# Patient Record
Sex: FEMALE | Race: BLACK OR AFRICAN AMERICAN | NOT HISPANIC OR LATINO | Employment: UNEMPLOYED | RURAL
[De-identification: names, ages, dates, MRNs, and addresses within clinical notes are randomized per-mention and may not be internally consistent; named-entity substitution may affect disease eponyms.]

---

## 2019-07-16 ENCOUNTER — HISTORICAL (OUTPATIENT)
Dept: ADMINISTRATIVE | Facility: HOSPITAL | Age: 54
End: 2019-07-16

## 2019-07-17 LAB
LAB AP CLINICAL INFORMATION: NORMAL
LAB AP GENERAL CAT - HISTORICAL: NORMAL
LAB AP INTERPRETATION/RESULT - HISTORICAL: NEGATIVE
LAB AP SPECIMEN ADEQUACY - HISTORICAL: NORMAL
LAB AP SPECIMEN SUBMITTED - HISTORICAL: NORMAL

## 2020-05-18 ENCOUNTER — HISTORICAL (OUTPATIENT)
Dept: ADMINISTRATIVE | Facility: HOSPITAL | Age: 55
End: 2020-05-18

## 2020-07-21 ENCOUNTER — HISTORICAL (OUTPATIENT)
Dept: ADMINISTRATIVE | Facility: HOSPITAL | Age: 55
End: 2020-07-21

## 2020-10-09 ENCOUNTER — HISTORICAL (OUTPATIENT)
Dept: ADMINISTRATIVE | Facility: HOSPITAL | Age: 55
End: 2020-10-09

## 2020-10-09 LAB
BACTERIA #/AREA URNS HPF: ABNORMAL /HPF
BILIRUB UR QL STRIP: NEGATIVE MG/DL
CLARITY UR: CLEAR
COLOR UR: YELLOW
GLUCOSE UR STRIP-MCNC: ABNORMAL MG/DL
KETONES UR STRIP-SCNC: NEGATIVE MG/DL
LEUKOCYTE ESTERASE UR QL STRIP: NEGATIVE LEU/UL
NITRITE UR QL STRIP: NEGATIVE
PH UR STRIP: 5.5 PH UNITS (ref 5–8)
PROT UR QL STRIP: NEGATIVE MG/DL
RBC # UR STRIP: ABNORMAL ERY/UL
RBC #/AREA URNS HPF: ABNORMAL /HPF (ref 0–3)
SP GR UR STRIP: 1.03 (ref 1–1.03)
SQUAMOUS #/AREA URNS LPF: ABNORMAL /LPF
UROBILINOGEN UR STRIP-ACNC: 0.2 MG/DL
WBC #/AREA URNS HPF: ABNORMAL /HPF (ref 0–5)

## 2020-10-11 LAB
REPORT: NORMAL

## 2021-09-29 ENCOUNTER — OFFICE VISIT (OUTPATIENT)
Dept: DERMATOLOGY | Facility: CLINIC | Age: 56
End: 2021-09-29
Payer: MEDICAID

## 2021-09-29 VITALS — HEIGHT: 65 IN | BODY MASS INDEX: 30.49 KG/M2 | WEIGHT: 183 LBS | RESPIRATION RATE: 16 BRPM

## 2021-09-29 DIAGNOSIS — L74.510 HYPERHIDROSIS OF AXILLA: ICD-10-CM

## 2021-09-29 DIAGNOSIS — L73.2 HIDRADENITIS SUPPURATIVA: Primary | ICD-10-CM

## 2021-09-29 PROCEDURE — 99214 PR OFFICE/OUTPT VISIT, EST, LEVL IV, 30-39 MIN: ICD-10-PCS | Mod: ,,, | Performed by: DERMATOLOGY

## 2021-09-29 PROCEDURE — 99214 OFFICE O/P EST MOD 30 MIN: CPT | Mod: ,,, | Performed by: DERMATOLOGY

## 2021-09-29 RX ORDER — ATENOLOL AND CHLORTHALIDONE TABLET 100; 25 MG/1; MG/1
1 TABLET ORAL DAILY
COMMUNITY
End: 2024-01-18

## 2021-09-29 RX ORDER — POTASSIUM CITRATE 10 MEQ/1
20 TABLET, EXTENDED RELEASE ORAL
COMMUNITY
Start: 2021-04-26

## 2021-09-29 RX ORDER — CHLORHEXIDINE GLUCONATE 40 MG/ML
SOLUTION TOPICAL
Qty: 473 ML | Refills: 11 | Status: SHIPPED | OUTPATIENT
Start: 2021-09-29

## 2021-09-29 RX ORDER — INSULIN GLARGINE 100 [IU]/ML
INJECTION, SOLUTION SUBCUTANEOUS
COMMUNITY
Start: 2021-09-24 | End: 2022-04-07

## 2021-09-29 RX ORDER — CALCIUM CITRATE/VITAMIN D3 200MG-6.25
TABLET ORAL
COMMUNITY
Start: 2021-08-06 | End: 2022-01-06 | Stop reason: SDUPTHER

## 2021-09-29 RX ORDER — LANCETS 33 GAUGE
EACH MISCELLANEOUS
COMMUNITY
Start: 2021-04-16 | End: 2022-01-06 | Stop reason: SDUPTHER

## 2021-09-29 RX ORDER — ALUMINUM CHLORIDE 20 %
SOLUTION, NON-ORAL TOPICAL
Qty: 60 ML | Refills: 11 | Status: SHIPPED | OUTPATIENT
Start: 2021-09-29 | End: 2023-06-05 | Stop reason: SDUPTHER

## 2021-09-29 RX ORDER — LIRAGLUTIDE 6 MG/ML
INJECTION SUBCUTANEOUS
COMMUNITY
Start: 2021-07-29 | End: 2021-12-10

## 2021-09-29 RX ORDER — CLINDAMYCIN PHOSPHATE 10 UG/ML
LOTION TOPICAL
Qty: 60 ML | Refills: 11 | OUTPATIENT
Start: 2021-09-29 | End: 2022-04-07

## 2021-09-29 RX ORDER — FLUCONAZOLE 150 MG/1
TABLET ORAL
COMMUNITY
Start: 2021-06-27 | End: 2022-04-07

## 2021-09-29 RX ORDER — PEN NEEDLE, DIABETIC 30 GX 1/3"
NEEDLE, DISPOSABLE MISCELLANEOUS
COMMUNITY
Start: 2015-04-28 | End: 2022-04-07

## 2021-09-29 RX ORDER — HYDROCODONE BITARTRATE AND ACETAMINOPHEN 10; 325 MG/1; MG/1
TABLET ORAL
COMMUNITY
Start: 2015-05-01

## 2021-09-29 RX ORDER — ERGOCALCIFEROL 1.25 MG/1
50000 CAPSULE ORAL
COMMUNITY
Start: 2015-05-01 | End: 2024-02-09 | Stop reason: SDUPTHER

## 2021-09-29 RX ORDER — BROMPHENIRAMINE MALEATE, DEXTROMETHORPHAN HYDROBROMIDE AND PHENYLEPHRINE HYDROCHLORIDE 1; 5; 2.5 MG/5ML; MG/5ML; MG/5ML
LIQUID ORAL
COMMUNITY
Start: 2021-09-24 | End: 2022-01-06 | Stop reason: ALTCHOICE

## 2021-09-29 RX ORDER — TAMSULOSIN HYDROCHLORIDE 0.4 MG/1
0.4 CAPSULE ORAL
COMMUNITY
Start: 2021-04-26 | End: 2024-01-18

## 2021-09-29 RX ORDER — DOXYCYCLINE 100 MG/1
CAPSULE ORAL
Qty: 60 CAPSULE | Refills: 5 | OUTPATIENT
Start: 2021-09-29 | End: 2022-04-07

## 2022-01-06 ENCOUNTER — OFFICE VISIT (OUTPATIENT)
Dept: FAMILY MEDICINE | Facility: CLINIC | Age: 57
End: 2022-01-06
Payer: MEDICAID

## 2022-01-06 VITALS
DIASTOLIC BLOOD PRESSURE: 74 MMHG | SYSTOLIC BLOOD PRESSURE: 137 MMHG | HEART RATE: 88 BPM | HEIGHT: 64 IN | WEIGHT: 183 LBS | TEMPERATURE: 99 F | RESPIRATION RATE: 18 BRPM | OXYGEN SATURATION: 99 % | BODY MASS INDEX: 31.24 KG/M2

## 2022-01-06 DIAGNOSIS — Z79.4 TYPE 2 DIABETES MELLITUS WITHOUT COMPLICATION, WITH LONG-TERM CURRENT USE OF INSULIN: ICD-10-CM

## 2022-01-06 DIAGNOSIS — E11.9 TYPE 2 DIABETES MELLITUS WITHOUT COMPLICATION, WITH LONG-TERM CURRENT USE OF INSULIN: ICD-10-CM

## 2022-01-06 DIAGNOSIS — R05.8 COUGH WITH EXPOSURE TO SEVERE ACUTE RESPIRATORY SYNDROME CORONAVIRUS 2 (SARS-COV-2): ICD-10-CM

## 2022-01-06 DIAGNOSIS — Z20.822 COUGH WITH EXPOSURE TO SEVERE ACUTE RESPIRATORY SYNDROME CORONAVIRUS 2 (SARS-COV-2): ICD-10-CM

## 2022-01-06 DIAGNOSIS — J32.9 SINUSITIS, UNSPECIFIED CHRONICITY, UNSPECIFIED LOCATION: Primary | ICD-10-CM

## 2022-01-06 PROCEDURE — 3008F PR BODY MASS INDEX (BMI) DOCUMENTED: ICD-10-PCS | Mod: CPTII,,, | Performed by: FAMILY MEDICINE

## 2022-01-06 PROCEDURE — 3075F SYST BP GE 130 - 139MM HG: CPT | Mod: CPTII,,, | Performed by: FAMILY MEDICINE

## 2022-01-06 PROCEDURE — 3008F BODY MASS INDEX DOCD: CPT | Mod: CPTII,,, | Performed by: FAMILY MEDICINE

## 2022-01-06 PROCEDURE — 1159F PR MEDICATION LIST DOCUMENTED IN MEDICAL RECORD: ICD-10-PCS | Mod: CPTII,,, | Performed by: FAMILY MEDICINE

## 2022-01-06 PROCEDURE — 1159F MED LIST DOCD IN RCRD: CPT | Mod: CPTII,,, | Performed by: FAMILY MEDICINE

## 2022-01-06 PROCEDURE — 3075F PR MOST RECENT SYSTOLIC BLOOD PRESS GE 130-139MM HG: ICD-10-PCS | Mod: CPTII,,, | Performed by: FAMILY MEDICINE

## 2022-01-06 PROCEDURE — 99213 PR OFFICE/OUTPT VISIT, EST, LEVL III, 20-29 MIN: ICD-10-PCS | Mod: ,,, | Performed by: FAMILY MEDICINE

## 2022-01-06 PROCEDURE — 3078F DIAST BP <80 MM HG: CPT | Mod: CPTII,,, | Performed by: FAMILY MEDICINE

## 2022-01-06 PROCEDURE — 99213 OFFICE O/P EST LOW 20 MIN: CPT | Mod: ,,, | Performed by: FAMILY MEDICINE

## 2022-01-06 PROCEDURE — 3078F PR MOST RECENT DIASTOLIC BLOOD PRESSURE < 80 MM HG: ICD-10-PCS | Mod: CPTII,,, | Performed by: FAMILY MEDICINE

## 2022-01-06 RX ORDER — CEFUROXIME AXETIL 500 MG/1
500 TABLET ORAL EVERY 12 HOURS
Qty: 20 TABLET | Refills: 0 | OUTPATIENT
Start: 2022-01-06 | End: 2022-04-07

## 2022-01-06 RX ORDER — BENZONATATE 100 MG/1
200 CAPSULE ORAL 3 TIMES DAILY PRN
Qty: 60 CAPSULE | Refills: 0 | Status: SHIPPED | OUTPATIENT
Start: 2022-01-06 | End: 2022-01-16

## 2022-01-06 NOTE — PATIENT INSTRUCTIONS
1. Take medications as directed  2. Monitor temperature, if fever begins, tyelenol or ibuprofen can be used as long as you have no history of abnormal reactions to these medications. Follow the instructions on the bottle or contact clinic with questions.   3. Please contact clinic if symptoms begin to get worse.   4. Report to the ER if you feel your symptoms are severe and life threatening.

## 2022-01-06 NOTE — PROGRESS NOTES
New Clinic Note    Leti Landeros is a 56 y.o. female     CC:   Chief Complaint   Patient presents with    Cough     Patient ststes she has coughed so much she is hurting in her side/ rib area.    Sinus Problem    Nasal Congestion     Patient states she went to [facility] 9 days ago. She has chronic sinusitis but has had a cough. Sputum is clear. She has had sinus drainage, sinus and facial pain and pressure. Both ears are hurting and she is hoarse. She states she is not getting worse but just not getting any better. She was not positve for covid or flu at that visit but she did caleb positive for strep. She was given a Rocephin shot, Amoxicillin, Ed A Hist and tessalon perls. Nothing has helped the cough.        Subjective    History of Present Illness Patient was seen for sinus problems and sore throat at [facility] about 9 days ago. She was treated for strep. She still has a cough and congestion.  Complains of sore throat and ear pain. She needs her diabetic supplies refilled.    Cough  Associated symptoms include ear pain and postnasal drip. Pertinent negatives include no chest pain, fever, headaches, rhinorrhea or shortness of breath.   Sinus Problem  Associated symptoms include congestion, coughing, ear pain and sinus pressure. Pertinent negatives include no headaches or shortness of breath.      Upper or lower respiratory infection symptoms:      When did your symptoms start: 9 days ago  Are they getting worse: no but not getting any better  What are your symptoms: constant cough, sinus congestion and pressure, ears hurting and hoarsnes  Is it productive: yes               what color sputum: clear      Current Outpatient Medications:     aluminum chloride (DRYSOL DAB-O-MATIC) 20 % external solution, Apply to underarms daily, Disp: 60 mL, Rfl: 11    atenoloL-chlorthalidone (TENORETIC) 100-25 mg per tablet, Take 1 tablet by mouth once daily., Disp: , Rfl:     chlorhexidine (HIBICLENS)  "4 % external liquid, Use as a daily wash to affected areas, Disp: 473 mL, Rfl: 11    clindamycin (CLEOCIN T) 1 % lotion, Apply thin layer to affected areas daily after shower, Disp: 60 mL, Rfl: 11    doxycycline (VIBRAMYCIN) 100 MG Cap, Take one 100 mg capsule twice a day with food and water but not dairy products, Disp: 60 capsule, Rfl: 5    ergocalciferol (ERGOCALCIFEROL) 50,000 unit Cap, , Disp: , Rfl:     HYDROcodone-acetaminophen (NORCO)  mg per tablet, , Disp: , Rfl:     LANTUS SOLOSTAR U-100 INSULIN glargine 100 units/mL (3mL) SubQ pen, INJECT 50 UNITS UNDER THE SKIN EVERY DAY, Disp: , Rfl:     pen needle, diabetic (NOVOTWIST) 32 gauge x 1/5" Ndle, , Disp: , Rfl:     potassium citrate (UROCIT-K) 10 mEq (1,080 mg) TbSR, Take 20 mEq by mouth., Disp: , Rfl:     tamsulosin (FLOMAX) 0.4 mg Cap, Take 0.4 mg by mouth., Disp: , Rfl:     VICTOZA 3-CHASIDY 0.6 mg/0.1 mL (18 mg/3 mL) PnIj pen, INJECT 1.8MG UNDER THE SKIN EVERY DAY, Disp: 9 mL, Rfl: 1    benzonatate (TESSALON) 100 MG capsule, Take 2 capsules (200 mg total) by mouth 3 (three) times daily as needed for Cough., Disp: 60 capsule, Rfl: 0    brompheniramin-phenylephrin-DM (RYNEX DM) 1-2.5-5 mg/5 mL Soln, Take 10 mLs by mouth every 4 (four) hours as needed., Disp: 180 mL, Rfl: 1    cefUROXime (CEFTIN) 500 MG tablet, Take 1 tablet (500 mg total) by mouth every 12 (twelve) hours., Disp: 20 tablet, Rfl: 0    fluconazole (DIFLUCAN) 150 MG Tab, TAKE 1 TABLET BY MOUTH 1 TIME, Disp: , Rfl:     SEMAGLUTIDE ORAL, Take by mouth., Disp: , Rfl:     TRUE METRIX GLUCOSE TEST STRIP Strp, USE ONE STRIP TO CHECK GLUCOSE TWICE DAILY ICD 10 E11.9, Disp: 300 strip, Rfl: 3    TRUEPLUS LANCETS 33 gauge Misc, USE 1 TO CHECK GLUCOSE TWICE DAILY ICD 10 code E11.9, Disp: 200 each, Rfl: 3     Past Medical History:   Diagnosis Date    Diabetes mellitus     Fibromyalgia     Scleroderma     Sinusitis         No family history on file.     Past Surgical History: " "  Procedure Laterality Date     SECTION      DILATION AND CURETTAGE OF UTERUS      TONSILLECTOMY, ADENOIDECTOMY          Review of Systems   Constitutional: Negative for fatigue and fever.   HENT: Positive for nasal congestion, ear pain, postnasal drip, sinus pressure/congestion and voice change. Negative for rhinorrhea.    Respiratory: Positive for cough. Negative for shortness of breath.    Cardiovascular: Negative for chest pain.   Gastrointestinal: Negative for abdominal pain, diarrhea, nausea and vomiting.   Genitourinary: Negative for dysuria.   Neurological: Negative for headaches.        /74 (BP Location: Left arm, Patient Position: Sitting, BP Method: Medium (Automatic))   Pulse 88   Temp 99.4 °F (37.4 °C) (Oral)   Resp 18   Ht 5' 4" (1.626 m)   Wt 83 kg (183 lb)   SpO2 99%   BMI 31.41 kg/m²      Physical Exam  HENT:      Head: Normocephalic and atraumatic.   Pulmonary:      Effort: Pulmonary effort is normal.   Neurological:      Mental Status: She is alert and oriented to person, place, and time.   Psychiatric:         Mood and Affect: Mood normal.         Behavior: Behavior normal.          Assessment and Plan      ICD-10-CM ICD-9-CM   1. Sinusitis, unspecified chronicity, unspecified location  J32.9 473.9   2. Cough with exposure to severe acute respiratory syndrome coronavirus 2 (SARS-CoV-2)  R05.8 786.2    Z20.822 V01.79   3. Type 2 diabetes mellitus without complication, with long-term current use of insulin  E11.9 250.00    Z79.4 V58.67        Problem List Items Addressed This Visit    None     Visit Diagnoses     Sinusitis, unspecified chronicity, unspecified location    -  Primary    Relevant Medications    cefUROXime (CEFTIN) 500 MG tablet    brompheniramin-phenylephrin-DM (RYNEX DM) 1-2.5-5 mg/5 mL Soln    benzonatate (TESSALON) 100 MG capsule    Cough with exposure to severe acute respiratory syndrome coronavirus 2 (SARS-CoV-2)        Relevant Orders    SARS Coronavirus " 2 Antigen, POCT    POCT Influenza A/B    Type 2 diabetes mellitus without complication, with long-term current use of insulin        Relevant Medications    TRUE METRIX GLUCOSE TEST STRIP Strp    [facility]PL[facility] LANCETS [facility] gauge [facility]           Patient Instructions   1. Take medications as directed  2. Monitor temperature, if fever begins, tyelenol or ibuprofen can be used as long as you have no history of abnormal reactions to these medications. Follow the instructions on the bottle or contact clinic with questions.   3. Please contact clinic if symptoms begin to get worse.   4. Report to the ER if you feel your symptoms are severe and life threatening.          Follow up if symptoms worsen or fail to improve.

## 2022-01-07 RX ORDER — LANCETS 33 GAUGE
EACH MISCELLANEOUS
Qty: 200 EACH | Refills: 3 | Status: SHIPPED | OUTPATIENT
Start: 2022-01-07 | End: 2023-11-04

## 2022-01-07 RX ORDER — CALCIUM CITRATE/VITAMIN D3 200MG-6.25
TABLET ORAL
Qty: 300 STRIP | Refills: 3 | Status: SHIPPED | OUTPATIENT
Start: 2022-01-07 | End: 2022-05-05

## 2022-01-16 PROBLEM — Z79.4 TYPE 2 DIABETES MELLITUS WITHOUT COMPLICATION, WITH LONG-TERM CURRENT USE OF INSULIN: Status: ACTIVE | Noted: 2022-01-16

## 2022-01-16 PROBLEM — E11.9 TYPE 2 DIABETES MELLITUS WITHOUT COMPLICATION, WITH LONG-TERM CURRENT USE OF INSULIN: Status: ACTIVE | Noted: 2022-01-16

## 2022-04-07 ENCOUNTER — HOSPITAL ENCOUNTER (EMERGENCY)
Facility: HOSPITAL | Age: 57
Discharge: HOME | End: 2022-04-07
Payer: MEDICAID

## 2022-04-07 VITALS
TEMPERATURE: 98 F | DIASTOLIC BLOOD PRESSURE: 75 MMHG | OXYGEN SATURATION: 98 % | SYSTOLIC BLOOD PRESSURE: 149 MMHG | HEIGHT: 64 IN | HEART RATE: 76 BPM | RESPIRATION RATE: 18 BRPM | WEIGHT: 173 LBS | BODY MASS INDEX: 29.53 KG/M2

## 2022-04-07 DIAGNOSIS — R73.9 HYPERGLYCEMIA: Primary | ICD-10-CM

## 2022-04-07 LAB
ALBUMIN SERPL BCP-MCNC: 3.6 G/DL (ref 3.5–5)
ALBUMIN/GLOB SERPL: 1.1 {RATIO}
ALP SERPL-CCNC: 81 U/L (ref 46–118)
ALT SERPL W P-5'-P-CCNC: 30 U/L (ref 13–56)
ANION GAP SERPL CALCULATED.3IONS-SCNC: 16 MMOL/L (ref 7–16)
AST SERPL W P-5'-P-CCNC: 21 U/L (ref 15–37)
BACTERIA #/AREA URNS HPF: ABNORMAL /HPF
BILIRUB SERPL-MCNC: 0.3 MG/DL (ref 0–1.2)
BILIRUB UR QL STRIP: NEGATIVE
BUN SERPL-MCNC: 11 MG/DL (ref 7–18)
BUN/CREAT SERPL: 16 (ref 6–20)
CALCIUM SERPL-MCNC: 8.6 MG/DL (ref 8.5–10.1)
CHLORIDE SERPL-SCNC: 101 MMOL/L (ref 98–107)
CLARITY UR: CLEAR
CO2 SERPL-SCNC: 27 MMOL/L (ref 21–32)
COLOR UR: YELLOW
CREAT SERPL-MCNC: 0.7 MG/DL (ref 0.55–1.02)
EOSINOPHIL NFR BLD MANUAL: 2 % (ref 1–4)
ERYTHROCYTE [DISTWIDTH] IN BLOOD BY AUTOMATED COUNT: 14.5 % (ref 11.5–14.5)
GLOBULIN SER-MCNC: 3.3 G/DL (ref 2–4)
GLUCOSE SERPL-MCNC: 265 MG/DL (ref 70–105)
GLUCOSE SERPL-MCNC: 318 MG/DL (ref 74–106)
GLUCOSE UR STRIP-MCNC: >=1000 MG/DL
HCT VFR BLD AUTO: 41.5 % (ref 38–47)
HGB BLD-MCNC: 13.4 G/DL (ref 12–16)
KETONES UR STRIP-SCNC: ABNORMAL MG/DL
LEUKOCYTE ESTERASE UR QL STRIP: NEGATIVE
LYMPHOCYTES NFR BLD MANUAL: 39 % (ref 27–41)
MCH RBC QN AUTO: 27.5 PG (ref 27–31)
MCHC RBC AUTO-ENTMCNC: 32.3 G/DL (ref 32–36)
MCV RBC AUTO: 85 FL (ref 80–96)
MONOCYTES NFR BLD MANUAL: 8 % (ref 2–6)
MPC BLD CALC-MCNC: 10 FL (ref 9.4–12.4)
MUCOUS THREADS #/AREA URNS HPF: ABNORMAL /HPF
NEUTS SEG NFR BLD MANUAL: 51 % (ref 50–62)
NITRITE UR QL STRIP: NEGATIVE
NRBC BLD MANUAL-RTO: ABNORMAL %
PH UR STRIP: 5.5 PH UNITS
PLATELET # BLD AUTO: 328 K/UL (ref 150–400)
POTASSIUM SERPL-SCNC: 3.6 MMOL/L (ref 3.5–5.1)
PROT SERPL-MCNC: 6.9 G/DL (ref 6.4–8.2)
PROT UR QL STRIP: NEGATIVE
RBC # BLD AUTO: 4.88 M/UL (ref 4.2–5.4)
RBC # UR STRIP: ABNORMAL /UL
RBC #/AREA URNS HPF: ABNORMAL /HPF
SODIUM SERPL-SCNC: 140 MMOL/L (ref 136–145)
SP GR UR STRIP: >=1.03
SQUAMOUS #/AREA URNS LPF: ABNORMAL /LPF
UROBILINOGEN UR STRIP-ACNC: 0.2 MG/DL
WBC # BLD AUTO: 8.1 K/UL (ref 4.5–11)
WBC #/AREA URNS HPF: ABNORMAL /HPF

## 2022-04-07 PROCEDURE — 96374 THER/PROPH/DIAG INJ IV PUSH: CPT

## 2022-04-07 PROCEDURE — 99283 EMERGENCY DEPT VISIT LOW MDM: CPT | Mod: ,,, | Performed by: NURSE PRACTITIONER

## 2022-04-07 PROCEDURE — 36415 COLL VENOUS BLD VENIPUNCTURE: CPT | Performed by: NURSE PRACTITIONER

## 2022-04-07 PROCEDURE — 82962 GLUCOSE BLOOD TEST: CPT

## 2022-04-07 PROCEDURE — 99284 EMERGENCY DEPT VISIT MOD MDM: CPT | Mod: 25

## 2022-04-07 PROCEDURE — 81001 URINALYSIS AUTO W/SCOPE: CPT | Performed by: NURSE PRACTITIONER

## 2022-04-07 PROCEDURE — 99283 PR EMERGENCY DEPT VISIT,LEVEL III: ICD-10-PCS | Mod: ,,, | Performed by: NURSE PRACTITIONER

## 2022-04-07 PROCEDURE — 63600175 PHARM REV CODE 636 W HCPCS: Performed by: NURSE PRACTITIONER

## 2022-04-07 PROCEDURE — 85025 COMPLETE CBC W/AUTO DIFF WBC: CPT | Performed by: NURSE PRACTITIONER

## 2022-04-07 PROCEDURE — 80053 COMPREHEN METABOLIC PANEL: CPT | Performed by: NURSE PRACTITIONER

## 2022-04-07 RX ADMIN — HUMAN INSULIN 5 UNITS: 100 INJECTION, SOLUTION SUBCUTANEOUS at 11:04

## 2022-04-07 NOTE — ED TRIAGE NOTES
"56 year old female presents to ed with c/o elevated blood sugar.   States her machine read "hi" this morning when she checked it.   C/o sinus headache and congestion  "

## 2022-04-07 NOTE — ED PROVIDER NOTES
Encounter Date: 2022       History     Chief Complaint   Patient presents with    Hyperglycemia     Leti Landeros is a 55 y/o AAF who presents to the ED with complaint of blood sugar being too high to read at home about 1 hour ago prior to taking Rybelsus  and Dr. Singh's (PCP) office is closed today. Denies any dizziness, headaches, fever,increase thirst, abdominal pain, nausea, vomiting or diarrhea. Reports urinary frequency started about 3 days ago.   A1C was greater than 14% about 2 months ago, blood sugars have been running 200-400's at home. Was seen by Dr. Singh 2 weeks ago, Rybelsus increased to 7 mg, but was unable to get with insurance. Has been taking a 3mg tab sample of Rybelsus until yesterday and she started taking 2 tabs a day. She has been off Lantus about 2-3 weeks since starting Rybelsus. States that Lantus made her have changes in vision. Has taken Tresiba in the past and tolerated well, but insurance would not cover it.  Of note, had UTI 3 weeks ago that was treated with Levaquin.        Review of patient's allergies indicates:   Allergen Reactions    Adhesive tape-silicones     Promethazine hcl     Sulfa (sulfonamide antibiotics)      Patient is also allergic to an unknown steroid drug    Ultram [tramadol]     Methocarbamol Nausea And Vomiting     Past Medical History:   Diagnosis Date    Diabetes mellitus     Fibromyalgia     Scleroderma     Sinusitis      Past Surgical History:   Procedure Laterality Date     SECTION      DILATION AND CURETTAGE OF UTERUS      TONSILLECTOMY, ADENOIDECTOMY       History reviewed. No pertinent family history.  Social History     Tobacco Use    Smoking status: Never Smoker    Smokeless tobacco: Never Used   Substance Use Topics    Alcohol use: Not Currently    Drug use: Never     Review of Systems   Constitutional: Negative.  Negative for activity change, appetite change, fatigue, fever and unexpected weight change.   HENT: Positive  for congestion. Negative for ear pain, postnasal drip, rhinorrhea, sinus pressure, sinus pain, sore throat and voice change.    Eyes: Positive for pain. Negative for photophobia and visual disturbance.   Respiratory: Negative for cough and shortness of breath.    Cardiovascular: Negative.    Gastrointestinal: Negative.  Negative for abdominal pain, diarrhea, nausea and vomiting.   Genitourinary: Positive for frequency. Negative for dysuria, hematuria, pelvic pain and vaginal discharge.   Musculoskeletal: Negative.    Skin: Negative.    Neurological: Negative.  Negative for dizziness, syncope, numbness and headaches.   Hematological: Negative.    Psychiatric/Behavioral: Negative.        Physical Exam     Initial Vitals [04/07/22 1004]   BP Pulse Resp Temp SpO2   (!) 165/85 84 18 98.1 °F (36.7 °C) 99 %      MAP       --         Physical Exam    Nursing note and vitals reviewed.  Constitutional: She appears well-developed and well-nourished. She is Obese . She is cooperative.   HENT:   Head: Normocephalic.   Right Ear: External ear normal.   Left Ear: External ear normal.   Nose: Nose normal.   Mouth/Throat: Uvula is midline, oropharynx is clear and moist and mucous membranes are normal.   Eyes: Conjunctivae and lids are normal. Pupils are equal, round, and reactive to light.   Neck: Neck supple.   Normal range of motion.  Cardiovascular: Normal rate, regular rhythm, normal heart sounds and normal pulses.   Pulmonary/Chest: Effort normal and breath sounds normal.   Abdominal: Abdomen is soft. Bowel sounds are normal. There is no abdominal tenderness.   Musculoskeletal:         General: Normal range of motion.      Cervical back: Normal range of motion and neck supple.     Lymphadenopathy:     She has no cervical adenopathy.   Neurological: She is alert and oriented to person, place, and time. No cranial nerve deficit.   Skin: Skin is warm and dry. Capillary refill takes less than 2 seconds. No rash noted.    Psychiatric: She has a normal mood and affect. Her behavior is normal. Judgment and thought content normal.         Medical Screening Exam   See Full Note    ED Course   Procedures  Labs Reviewed   COMPREHENSIVE METABOLIC PANEL - Abnormal; Notable for the following components:       Result Value    Glucose 318 (*)     All other components within normal limits   URINALYSIS, REFLEX TO URINE CULTURE - Abnormal; Notable for the following components:    Glucose, UA >=1000 (*)     Blood, UA Trace-Intact (*)     Specific Gravity, UA >=1.030 (*)     All other components within normal limits   MANUAL DIFFERENTIAL - Abnormal; Notable for the following components:    Monocytes, Man % 8 (*)     All other components within normal limits   URINALYSIS, MICROSCOPIC - Abnormal; Notable for the following components:    WBC, UA 5-10 (*)     RBC, UA 3-5 (*)     Squamous Epithelial Cells, UA Few (*)     Mucus, UA Few (*)     All other components within normal limits   POCT GLUCOSE MONITORING CONTINUOUS - Abnormal; Notable for the following components:    POC Glucose 265 (*)     All other components within normal limits   CBC WITH DIFFERENTIAL - Normal   CBC W/ AUTO DIFFERENTIAL    Narrative:     The following orders were created for panel order CBC auto differential.  Procedure                               Abnormality         Status                     ---------                               -----------         ------                     CBC with Differential[[id number]]        Normal              Final result               Manual Differential[[id number]]          Abnormal            Final result                 Please view results for these tests on the individual orders.   POCT GLUCOSE MONITORING CONTINUOUS          Imaging Results    None          Medications   insulin regular injection 5 Units (5 Units Intravenous Given 4/7/22 1116)     Medical Decision Making:   ED Management:  11:38 RBS down to 265 mg/dL. Patient will follow up  with provider in clinic tomorrow. Reviewed discharge instructions. She agreed to treament plan and verbalized understanding.                   Clinical Impression:   Final diagnoses:  [R73.9] Hyperglycemia (Primary)          ED Disposition Condition    Discharge Stable        ED Prescriptions     None        Follow-up Information     Follow up With Specialties Details Why Contact Info    Efe Singh MD Internal Medicine Schedule an appointment as soon as possible for a visit in 1 day For emergency department follow. [phone]  [facility]  [place] [id number]  [phone]             Olamide Johnson, NATHANIEL  04/07/22 1143

## 2022-04-07 NOTE — DISCHARGE INSTRUCTIONS
-Increase water intake and limit carb intake today.  -Take routine medications as prescribed.  -Monitor blood sugars twice a day at alternating times and record. Take record of readings to clinic for follow up with your primary care provider.

## 2022-04-08 ENCOUNTER — TELEPHONE (OUTPATIENT)
Dept: EMERGENCY MEDICINE | Facility: HOSPITAL | Age: 57
End: 2022-04-08
Payer: MEDICAID

## 2022-04-08 NOTE — TELEPHONE ENCOUNTER
rec'd call back patient states she is feeling ok but did not follow up with dr hand today because she felt too weak

## 2023-06-05 ENCOUNTER — OFFICE VISIT (OUTPATIENT)
Dept: DERMATOLOGY | Facility: CLINIC | Age: 58
End: 2023-06-05
Payer: MEDICAID

## 2023-06-05 DIAGNOSIS — R61 HYPERHIDROSIS: ICD-10-CM

## 2023-06-05 DIAGNOSIS — L73.2 HIDRADENITIS SUPPURATIVA: Primary | ICD-10-CM

## 2023-06-05 DIAGNOSIS — L60.0 INGROWN TOENAIL OF BOTH FEET: ICD-10-CM

## 2023-06-05 PROCEDURE — 1159F PR MEDICATION LIST DOCUMENTED IN MEDICAL RECORD: ICD-10-PCS | Mod: CPTII,,, | Performed by: DERMATOLOGY

## 2023-06-05 PROCEDURE — 1160F PR REVIEW ALL MEDS BY PRESCRIBER/CLIN PHARMACIST DOCUMENTED: ICD-10-PCS | Mod: CPTII,,, | Performed by: DERMATOLOGY

## 2023-06-05 PROCEDURE — 1160F RVW MEDS BY RX/DR IN RCRD: CPT | Mod: CPTII,,, | Performed by: DERMATOLOGY

## 2023-06-05 PROCEDURE — 99214 PR OFFICE/OUTPT VISIT, EST, LEVL IV, 30-39 MIN: ICD-10-PCS | Mod: ,,, | Performed by: DERMATOLOGY

## 2023-06-05 PROCEDURE — 99214 OFFICE O/P EST MOD 30 MIN: CPT | Mod: ,,, | Performed by: DERMATOLOGY

## 2023-06-05 PROCEDURE — 1159F MED LIST DOCD IN RCRD: CPT | Mod: CPTII,,, | Performed by: DERMATOLOGY

## 2023-06-05 RX ORDER — CLINDAMYCIN PHOSPHATE 10 UG/ML
LOTION TOPICAL
Qty: 60 ML | Refills: 11 | Status: SHIPPED | OUTPATIENT
Start: 2023-06-05

## 2023-06-05 RX ORDER — ALUMINUM CHLORIDE 20 %
SOLUTION, NON-ORAL TOPICAL
Qty: 60 ML | Refills: 11 | Status: SHIPPED | OUTPATIENT
Start: 2023-06-05

## 2023-06-05 NOTE — PROGRESS NOTES
[facility]   Yanique Munguia MD    Patient Name: Leti Landeros  Patient YOB: 1965   Date of Service: 23    CC: Follow-up Hidradenitis Suppurative and hyperhidrosis    HPI: Leti Landeros is a 58 y.o. female here today for follow-up of HS, last seen 2021.  Previous treatments include clindamycin lotion, and hibiclens.  Overall, the HS is stable.  Treatment plan was followed as directed. Patient is also following up for hyperhidrosis and concerned about ingrown toenails    Past Medical History:   Diagnosis Date    Diabetes mellitus     Fibromyalgia     Scleroderma     Sinusitis      Past Surgical History:   Procedure Laterality Date     SECTION      DILATION AND CURETTAGE OF UTERUS      TONSILLECTOMY, ADENOIDECTOMY       Review of patient's allergies indicates:   Allergen Reactions    Adhesive tape-silicones     Promethazine hcl     Sulfa (sulfonamide antibiotics)      Patient is also allergic to an unknown steroid drug    Ultram [tramadol]     Methocarbamol Nausea And Vomiting       Current Outpatient Medications:     aluminum chloride (DRYSOL DAB-O-MATIC) 20 % external solution, Apply to underarms daily, Disp: 60 mL, Rfl: 11    atenoloL-chlorthalidone (TENORETIC) 100-25 mg per tablet, Take 1 tablet by mouth once daily., Disp: , Rfl:     blood sugar diagnostic (TRUE METRIX GLUCOSE TEST STRIP) Strp, USE ONE STRIP TO CHECK GLUCOSE TWICE DAILY, Disp: 50 strip, Rfl: 11    chlorhexidine (HIBICLENS) 4 % external liquid, Use as a daily wash to affected areas, Disp: 473 mL, Rfl: 11    clindamycin (CLEOCIN T) 1 % lotion, Apply thin layer to affected areas daily after shower, Disp: 60 mL, Rfl: 11    ergocalciferol (ERGOCALCIFEROL) 50,000 unit Cap, , Disp: , Rfl:     HYDROcodone-acetaminophen (NORCO)  mg per tablet, , Disp: , Rfl:     potassium citrate (UROCIT-K) 10 mEq (1,080 mg) TbSR, Take 20 mEq by mouth., Disp: , Rfl:     SEMAGLUTIDE ORAL, Take by mouth., Disp: , Rfl:      tamsulosin (FLOMAX) 0.4 mg Cap, Take 0.4 mg by mouth., Disp: , Rfl:     TRUEPLUS LANCETS 33 gauge Misc, USE 1 TO CHECK GLUCOSE TWICE DAILY ICD 10 code E11.9, Disp: 200 each, Rfl: 3    VICTOZA 3-CHASIDY 0.6 mg/0.1 mL (18 mg/3 mL) PnIj pen, INJECT 1.8MG UNDER THE SKIN EVERY DAY, Disp: 9 mL, Rfl: 1    ROS: A focused review of systems was obtained and negative.     Exam: A focused skin exam was performed. All areas examined were normal except as mentioned in the assessment and plan below.  General Appearance of the patient is well developed and well nourished.  Orientation: alert and oriented x 3.  Mood and affect: pleasant.    Assessment:   The primary encounter diagnosis was Hidradenitis suppurativa. Diagnoses of Hyperhidrosis and Ingrown toenail of both feet were also pertinent to this visit.    Plan:   Medications Ordered This Encounter   Medications    aluminum chloride (DRYSOL DAB-O-MATIC) 20 % external solution     Sig: Apply to underarms daily     Dispense:  60 mL     Refill:  11    clindamycin (CLEOCIN T) 1 % lotion     Sig: Apply thin layer to affected areas daily after shower     Dispense:  60 mL     Refill:  11       Hidradenitis Suppurativa  - acneiform nodules  Coelho Stage: I  Status: Inadequately controlled     Plan: Counseling.  I counseled the patient regarding the following:  Skin care: Cleanse acneiform lesions and sinus tracts with anti-bacterial washes. Oral antibiotics can help reduce  inflammation.  Expectations: Hidradenitis Suppurativa is characterized by acneiform lesions and draining sinus tracts in the  axillary, abdominal or inguinal folds.  Contact office if: Patient develops worsening lesions or fistula formation despite treatment.      - Will refill clindamycin lotion    Hyperhidrosis  - Excessive sweating  Status: Inadequately controlled      Plan: Counseling.  I counseled the patient regarding the following:  Skin care: Hyperhidrosis can be treated with 20% aluminum chloride at bedtime,  iontophoresis and botulinum  toxin. Severe cases can be treated with robinul.  Expectations: Hyperhidrosis is excessive sweating, and usually occurs in the axilla, palms or soles.  Contact office if: Hyperhidrosis persists despite ongoing therapy, or if patient develops a side effect from  Treatment.    - Will refill Drysol    Ingrown toenails (L60.0)    - Will refer to Podiatry    No follow-ups on file.    Yanique Munguia MD

## 2023-06-14 ENCOUNTER — HOSPITAL ENCOUNTER (OUTPATIENT)
Dept: RADIOLOGY | Facility: HOSPITAL | Age: 58
Discharge: HOME | End: 2023-06-14
Attending: INTERNAL MEDICINE
Payer: MEDICAID

## 2023-06-14 DIAGNOSIS — M25.559 PAIN IN HIP: ICD-10-CM

## 2023-06-14 PROCEDURE — 73522 X-RAY EXAM HIPS BI 3-4 VIEWS: CPT | Mod: TC

## 2023-08-31 ENCOUNTER — OFFICE VISIT (OUTPATIENT)
Dept: OTOLARYNGOLOGY | Facility: CLINIC | Age: 58
End: 2023-08-31
Payer: MEDICAID

## 2023-08-31 VITALS — BODY MASS INDEX: 29.16 KG/M2 | HEIGHT: 65 IN | WEIGHT: 175 LBS

## 2023-08-31 DIAGNOSIS — R49.0 DYSPHONIA: ICD-10-CM

## 2023-08-31 DIAGNOSIS — K44.9 HIATAL HERNIA: Primary | ICD-10-CM

## 2023-08-31 DIAGNOSIS — K21.9 GASTROESOPHAGEAL REFLUX DISEASE WITHOUT ESOPHAGITIS: ICD-10-CM

## 2023-08-31 PROCEDURE — 3008F PR BODY MASS INDEX (BMI) DOCUMENTED: ICD-10-PCS | Mod: CPTII,,, | Performed by: OTOLARYNGOLOGY

## 2023-08-31 PROCEDURE — 1160F PR REVIEW ALL MEDS BY PRESCRIBER/CLIN PHARMACIST DOCUMENTED: ICD-10-PCS | Mod: CPTII,,, | Performed by: OTOLARYNGOLOGY

## 2023-08-31 PROCEDURE — 3008F BODY MASS INDEX DOCD: CPT | Mod: CPTII,,, | Performed by: OTOLARYNGOLOGY

## 2023-08-31 PROCEDURE — 99204 OFFICE O/P NEW MOD 45 MIN: CPT | Mod: S$PBB,,, | Performed by: OTOLARYNGOLOGY

## 2023-08-31 PROCEDURE — 1159F MED LIST DOCD IN RCRD: CPT | Mod: CPTII,,, | Performed by: OTOLARYNGOLOGY

## 2023-08-31 PROCEDURE — 1159F PR MEDICATION LIST DOCUMENTED IN MEDICAL RECORD: ICD-10-PCS | Mod: CPTII,,, | Performed by: OTOLARYNGOLOGY

## 2023-08-31 PROCEDURE — 99214 OFFICE O/P EST MOD 30 MIN: CPT | Mod: PBBFAC | Performed by: OTOLARYNGOLOGY

## 2023-08-31 PROCEDURE — 99204 PR OFFICE/OUTPT VISIT, NEW, LEVL IV, 45-59 MIN: ICD-10-PCS | Mod: S$PBB,,, | Performed by: OTOLARYNGOLOGY

## 2023-08-31 PROCEDURE — 1160F RVW MEDS BY RX/DR IN RCRD: CPT | Mod: CPTII,,, | Performed by: OTOLARYNGOLOGY

## 2023-08-31 NOTE — PROGRESS NOTES
Subjective:       Patient ID: Leti Landeros is a 58 y.o. female.    Chief Complaint: Hoarse (Hoarseness. Pt states she gets a discomfort at random times and after eating in gastroesophageal junction, currently on nexium po daily, has seen a GI MD years ago.)  Just changed from protonix to nexium last week   HPI  Review of Systems   HENT:  Positive for sore throat, trouble swallowing and voice change.    All other systems reviewed and are negative.      Objective:      Physical Exam  General: NAD  Head: Normocephalic, atraumatic, no facial asymmetry/normal strength,  Ears: Both auricules normal in appearance, w/o deformities tympanic membranes normal external auditory canals normal  Nose: External nose w/o deformities normal turbinates no drainage or inflammation  Oral Cavity: Lips, gums, floor of mouth, tongue hard palate, and buccal mucosa without mass/lesion  Oropharynx: Mucosa pink and moist, soft palate, posterior pharynx and oropharyngeal wall without mass/lesion  Neck: Supple, symmetric, trachea midline, no palpable mass/lesion, no palpable cervical lymphadenopathy  Skin: Warm and dry, no concerning lesions  Respiratory: Respirations even, unlabored   Assessment:       1. Hiatal hernia    2. Gastroesophageal reflux disease without esophagitis    3. Dysphonia        Plan:       discussed FOL in office will give a few weeks on Nexium first   Refer to GI for better management of HH   F/u 1 month

## 2023-11-04 ENCOUNTER — HOSPITAL ENCOUNTER (EMERGENCY)
Facility: HOSPITAL | Age: 58
Discharge: HOME | End: 2023-11-05
Payer: MEDICAID

## 2023-11-04 VITALS
OXYGEN SATURATION: 100 % | BODY MASS INDEX: 27.66 KG/M2 | WEIGHT: 162 LBS | TEMPERATURE: 98 F | HEIGHT: 64 IN | SYSTOLIC BLOOD PRESSURE: 140 MMHG | DIASTOLIC BLOOD PRESSURE: 76 MMHG | RESPIRATION RATE: 18 BRPM | HEART RATE: 89 BPM

## 2023-11-04 DIAGNOSIS — S90.31XA CONTUSION OF RIGHT FOOT, INITIAL ENCOUNTER: Primary | ICD-10-CM

## 2023-11-04 DIAGNOSIS — M79.671 RIGHT FOOT PAIN: ICD-10-CM

## 2023-11-04 PROCEDURE — 63600175 PHARM REV CODE 636 W HCPCS: Performed by: REGISTERED NURSE

## 2023-11-04 PROCEDURE — 99284 EMERGENCY DEPT VISIT MOD MDM: CPT

## 2023-11-04 PROCEDURE — 99284 EMERGENCY DEPT VISIT MOD MDM: CPT | Mod: ,,, | Performed by: REGISTERED NURSE

## 2023-11-04 PROCEDURE — 99284 PR EMERGENCY DEPT VISIT,LEVEL IV: ICD-10-PCS | Mod: ,,, | Performed by: REGISTERED NURSE

## 2023-11-04 PROCEDURE — 96372 THER/PROPH/DIAG INJ SC/IM: CPT | Performed by: REGISTERED NURSE

## 2023-11-04 RX ORDER — ATORVASTATIN CALCIUM 20 MG/1
20 TABLET, FILM COATED ORAL NIGHTLY
COMMUNITY
Start: 2023-08-29 | End: 2024-01-18

## 2023-11-04 RX ORDER — INSULIN DEGLUDEC 100 U/ML
INJECTION, SOLUTION SUBCUTANEOUS
COMMUNITY
Start: 2023-10-24 | End: 2024-01-18

## 2023-11-04 RX ORDER — EMPAGLIFLOZIN 10 MG/1
10 TABLET, FILM COATED ORAL EVERY MORNING
COMMUNITY
Start: 2023-10-24 | End: 2024-01-18

## 2023-11-04 RX ORDER — ESOMEPRAZOLE MAGNESIUM 40 MG/1
40 CAPSULE, DELAYED RELEASE ORAL DAILY
COMMUNITY
Start: 2023-10-02

## 2023-11-04 RX ORDER — KETOROLAC TROMETHAMINE 30 MG/ML
60 INJECTION, SOLUTION INTRAMUSCULAR; INTRAVENOUS
Status: COMPLETED | OUTPATIENT
Start: 2023-11-05 | End: 2023-11-04

## 2023-11-04 RX ORDER — TIRZEPATIDE 12.5 MG/.5ML
INJECTION, SOLUTION SUBCUTANEOUS
COMMUNITY
Start: 2023-10-24 | End: 2024-01-18

## 2023-11-04 RX ADMIN — KETOROLAC TROMETHAMINE 60 MG: 30 INJECTION INTRAMUSCULAR; INTRAVENOUS at 11:11

## 2023-11-05 NOTE — ED PROVIDER NOTES
Encounter Date: 2023       History     Chief Complaint   Patient presents with    Foot Injury     Right foot injury     Leti Landeros is a 59 yo AAF that presents with right foot pain after she dropped a ridge type vase on the top of right foot at [employer] today.  Pt is ambulatory but reports discomfort with ambulation.  States the pain is burning in nature 10/10 on pain scale.  There is no discoloration, swelling, or deformity.  Pt is able to move all toes on right foot.    The history is provided by the patient.     Review of patient's allergies indicates:   Allergen Reactions    Adhesive tape-silicones     Corticosteroids (glucocorticoids)     Lincocin [lincomycin]     Promethazine hcl     Sulfa (sulfonamide antibiotics)      Patient is also allergic to an unknown steroid drug    Ultram [tramadol]     Methocarbamol Nausea And Vomiting     Past Medical History:   Diagnosis Date    Diabetes mellitus     Fibromyalgia     Hypertension     Scleroderma     Sinusitis      Past Surgical History:   Procedure Laterality Date     SECTION      DILATION AND CURETTAGE OF UTERUS      TONSILLECTOMY, ADENOIDECTOMY       Family History   Problem Relation Age of Onset    Diabetes Mother     Cancer Father      Social History     Tobacco Use    Smoking status: Never    Smokeless tobacco: Never   Substance Use Topics    Alcohol use: Not Currently    Drug use: Never     Review of Systems   Musculoskeletal:  Positive for arthralgias and gait problem. Negative for joint swelling.   Skin:  Negative for color change and wound.   All other systems reviewed and are negative.      Physical Exam     Initial Vitals [23]   BP Pulse Resp Temp SpO2   (!) 140/76 89 18 98.4 °F (36.9 °C) 100 %      MAP       --         Physical Exam    Constitutional: She appears well-developed and well-nourished. She is not diaphoretic. She is active and cooperative.  Non-toxic appearance. She does not have a sickly appearance. She does  "not appear ill. No distress.   HENT:   Head: Normocephalic and atraumatic.   Right Ear: External ear normal.   Left Ear: External ear normal.   Nose: Nose normal.   Mouth/Throat: Oropharynx is clear and moist. No oropharyngeal exudate.   Eyes: EOM are normal. Pupils are equal, round, and reactive to light.   Neck: Neck supple.   Normal range of motion.  Cardiovascular:  Normal rate, regular rhythm, normal heart sounds and intact distal pulses.           Pulmonary/Chest: Breath sounds normal. No respiratory distress.   Abdominal: Abdomen is soft. Bowel sounds are normal.   Musculoskeletal:      Cervical back: Normal range of motion and neck supple.      Right foot: Decreased range of motion. Tenderness present. No swelling, deformity or laceration. Normal pulse.      Left foot: Normal.        Feet:      Neurological: She is alert and oriented to person, place, and time. GCS score is 15. GCS eye subscore is 4. GCS verbal subscore is 5. GCS motor subscore is 6.   Skin: Skin is warm and dry.   Psychiatric: She has a normal mood and affect. Her behavior is normal. Judgment and thought content normal.         Medical Screening Exam   See Full Note    ED Course   Procedures  Labs Reviewed - No data to display       Imaging Results              X-Ray Foot Complete Right (In process)                      Medications   ketorolac injection 60 mg (60 mg Intramuscular Given 11/4/23 7986)     Medical Decision Making  59 yo AAF with right ankle/foot pain after dropping ridge type vase on top of foot at [employer].    -Preliminary radiology report no acute fracture or dislocation  -Pt requests "a boot" and explanation was given that a boot is not necessary for a contusion  -Detailed discharge instructions discussed with pt and she verbalized understanding and is in agreement with the plan of care.        Amount and/or Complexity of Data Reviewed  Radiology: ordered.    Risk  Prescription drug management.                           "     Clinical Impression:   Final diagnoses:  [S90.31XA] Contusion of right foot, initial encounter (Primary)        ED Disposition Condition    Discharge Stable          ED Prescriptions    None       Follow-up Information       Follow up With Specialties Details Why Contact Info    Efe Singh MD Internal Medicine In 2 days If symptoms worsen or fail to improve [address] [place] [address] [facility] [place] [address]  [phone]               Cristin Montesinos, NP-C  11/04/23 0213

## 2023-11-05 NOTE — DISCHARGE INSTRUCTIONS
-Ibuprofen as needed for pain or discomfort  -Elevate and apply ice as directed in your instructions as needed  -Follow up with your regular provider Monday 11-6-23 if symptoms are not improving

## 2023-11-05 NOTE — ED TRIAGE NOTES
Rec'd ambulatory 57 y/o F to triage w/ c/o right foot injury w/ pain s/p something falling on top of right foot at [employer].

## 2023-11-08 ENCOUNTER — OFFICE VISIT (OUTPATIENT)
Dept: OTOLARYNGOLOGY | Facility: CLINIC | Age: 58
End: 2023-11-08
Payer: MEDICAID

## 2023-11-08 ENCOUNTER — TELEPHONE (OUTPATIENT)
Dept: EMERGENCY MEDICINE | Facility: HOSPITAL | Age: 58
End: 2023-11-08
Payer: MEDICAID

## 2023-11-08 VITALS — BODY MASS INDEX: 27.66 KG/M2 | HEIGHT: 64 IN | WEIGHT: 162 LBS

## 2023-11-08 DIAGNOSIS — K44.9 HIATAL HERNIA: ICD-10-CM

## 2023-11-08 DIAGNOSIS — K21.9 GASTROESOPHAGEAL REFLUX DISEASE WITHOUT ESOPHAGITIS: ICD-10-CM

## 2023-11-08 DIAGNOSIS — J02.9 PHARYNGITIS, UNSPECIFIED ETIOLOGY: ICD-10-CM

## 2023-11-08 DIAGNOSIS — R13.10 DYSPHAGIA, UNSPECIFIED TYPE: Primary | ICD-10-CM

## 2023-11-08 PROCEDURE — 1160F PR REVIEW ALL MEDS BY PRESCRIBER/CLIN PHARMACIST DOCUMENTED: ICD-10-PCS | Mod: CPTII,,, | Performed by: OTOLARYNGOLOGY

## 2023-11-08 PROCEDURE — 31575 DIAGNOSTIC LARYNGOSCOPY: CPT | Mod: S$PBB,,, | Performed by: OTOLARYNGOLOGY

## 2023-11-08 PROCEDURE — 3008F BODY MASS INDEX DOCD: CPT | Mod: CPTII,,, | Performed by: OTOLARYNGOLOGY

## 2023-11-08 PROCEDURE — 1160F RVW MEDS BY RX/DR IN RCRD: CPT | Mod: CPTII,,, | Performed by: OTOLARYNGOLOGY

## 2023-11-08 PROCEDURE — 1159F PR MEDICATION LIST DOCUMENTED IN MEDICAL RECORD: ICD-10-PCS | Mod: CPTII,,, | Performed by: OTOLARYNGOLOGY

## 2023-11-08 PROCEDURE — 3008F PR BODY MASS INDEX (BMI) DOCUMENTED: ICD-10-PCS | Mod: CPTII,,, | Performed by: OTOLARYNGOLOGY

## 2023-11-08 PROCEDURE — 99214 PR OFFICE/OUTPT VISIT, EST, LEVL IV, 30-39 MIN: ICD-10-PCS | Mod: 25,S$PBB,, | Performed by: OTOLARYNGOLOGY

## 2023-11-08 PROCEDURE — 99213 OFFICE O/P EST LOW 20 MIN: CPT | Mod: PBBFAC | Performed by: OTOLARYNGOLOGY

## 2023-11-08 PROCEDURE — 31575 DIAGNOSTIC LARYNGOSCOPY: CPT | Mod: PBBFAC | Performed by: OTOLARYNGOLOGY

## 2023-11-08 PROCEDURE — 1159F MED LIST DOCD IN RCRD: CPT | Mod: CPTII,,, | Performed by: OTOLARYNGOLOGY

## 2023-11-08 PROCEDURE — 31575 PR LARYNGOSCOPY, FLEXIBLE; DIAGNOSTIC: ICD-10-PCS | Mod: S$PBB,,, | Performed by: OTOLARYNGOLOGY

## 2023-11-08 PROCEDURE — 99214 OFFICE O/P EST MOD 30 MIN: CPT | Mod: 25,S$PBB,, | Performed by: OTOLARYNGOLOGY

## 2023-11-08 NOTE — PROGRESS NOTES
Subjective:       Patient ID: Leti Landeros is a 58 y.o. female.    Chief Complaint: Follow-up (1 month follow up. States no change with her voice.)    Follow-up  Associated symptoms include a sore throat.     Review of Systems   HENT:  Positive for sore throat, trouble swallowing and voice change.    All other systems reviewed and are negative.      Objective:      Physical Exam  General: NAD  Head: Normocephalic, atraumatic, no facial asymmetry/normal strength,  Ears: Both auricules normal in appearance, w/o deformities tympanic membranes normal external auditory canals normal  Nose: External nose w/o deformities normal turbinates no drainage or inflammation  Oral Cavity: Lips, gums, floor of mouth, tongue hard palate, and buccal mucosa without mass/lesion  Oropharynx: Mucosa pink and moist, soft palate, posterior pharynx and oropharyngeal wall without mass/lesion  Neck: Supple, symmetric, trachea midline, no palpable mass/lesion, no palpable cervical lymphadenopathy  Skin: Warm and dry, no concerning lesions  Respiratory: Respirations even, unlabored     Nasal/Nasopharyngo/Laryn/Hypopharyngoscopy Procedures   Procedure: Flexible laryngoscopy  In order to fully examine the upper aerodigestive tract, including the larynx, in a patient with a hyperactive gag reflex, flexible endoscopy is required.  After explaining the procedure and obtaining verbal consent, a timeout was performed with the patient's participation according to the universal protocol. Both nasal cavities were anesthetized with 4% Xylocaine spray mixed with Dario-Synephrine. The flexible laryngoscope was inserted into the nasal cavity and advanced to visualize the nasal cavity, nasopharynx, the posterior oropharynx, hypopharynx, and the endolarynx with the above findings noted. The scope was removed and the procedure terminated. The patient tolerated this procedure well without apparent complication.      Procedure:  Diagnostic nasal,  nasopharyngoscopy, laryngoscopy and hypopharyngoscopy.    Routine preparation with local atomizer with 1% neosynephrine and lidocaine . With customary flexible endoscope.     NOSE:   External:  No gross deformity   Intranasal:    Mucosa:  No polyps, ulcers or lesions.    Septum:  No gross deformity.    Turbinates:  Not enlarged.    Nasopharynx:  No lesions.   Mucosa:  No lesions.   Posterior Choanae:  Patent.   Eustachian Tubes:  Patent.  Larynx/hypopharynx:   Epiglottis:  No lesions, without edema.   AE Folds:  No lesions.   Vocal cords:  No polyps; nl mobility   Subglottis: No obvious stenosis   Hypopharynx:  No lesions.   Piriform sinus:  No pooling or lesions.   Post Cricoid:  +edema + erythema   Assessment:       1. Dysphagia, unspecified type    2. Gastroesophageal reflux disease without esophagitis    3. Hiatal hernia    4. Pharyngitis, unspecified etiology        Plan:       Continue nexium   See GI

## 2023-12-06 ENCOUNTER — OFFICE VISIT (OUTPATIENT)
Dept: FAMILY MEDICINE | Facility: CLINIC | Age: 58
End: 2023-12-06
Payer: MEDICAID

## 2023-12-06 VITALS
HEIGHT: 64 IN | RESPIRATION RATE: 18 BRPM | TEMPERATURE: 98 F | OXYGEN SATURATION: 98 % | BODY MASS INDEX: 26.12 KG/M2 | SYSTOLIC BLOOD PRESSURE: 123 MMHG | DIASTOLIC BLOOD PRESSURE: 73 MMHG | HEART RATE: 88 BPM | WEIGHT: 153 LBS

## 2023-12-06 DIAGNOSIS — R30.0 DYSURIA: ICD-10-CM

## 2023-12-06 DIAGNOSIS — N39.0 URINARY TRACT INFECTION WITH HEMATURIA, SITE UNSPECIFIED: ICD-10-CM

## 2023-12-06 DIAGNOSIS — R31.9 URINARY TRACT INFECTION WITH HEMATURIA, SITE UNSPECIFIED: ICD-10-CM

## 2023-12-06 DIAGNOSIS — M79.671 RIGHT FOOT PAIN: Primary | ICD-10-CM

## 2023-12-06 LAB
BILIRUB SERPL-MCNC: NEGATIVE MG/DL
BLOOD URINE, POC: ABNORMAL
COLOR, POC UA: YELLOW
GLUCOSE UR QL STRIP: 1000
KETONES UR QL STRIP: NEGATIVE
LEUKOCYTE ESTERASE URINE, POC: NEGATIVE
NITRITE, POC UA: NEGATIVE
PH, POC UA: 5
PROTEIN, POC: NEGATIVE
SPECIFIC GRAVITY, POC UA: 1.01
UROBILINOGEN, POC UA: 0.2

## 2023-12-06 PROCEDURE — 3078F PR MOST RECENT DIASTOLIC BLOOD PRESSURE < 80 MM HG: ICD-10-PCS | Mod: CPTII,,, | Performed by: FAMILY MEDICINE

## 2023-12-06 PROCEDURE — 3078F DIAST BP <80 MM HG: CPT | Mod: CPTII,,, | Performed by: FAMILY MEDICINE

## 2023-12-06 PROCEDURE — 1160F RVW MEDS BY RX/DR IN RCRD: CPT | Mod: CPTII,,, | Performed by: FAMILY MEDICINE

## 2023-12-06 PROCEDURE — 99213 PR OFFICE/OUTPT VISIT, EST, LEVL III, 20-29 MIN: ICD-10-PCS | Mod: 25,,, | Performed by: FAMILY MEDICINE

## 2023-12-06 PROCEDURE — 1159F PR MEDICATION LIST DOCUMENTED IN MEDICAL RECORD: ICD-10-PCS | Mod: CPTII,,, | Performed by: FAMILY MEDICINE

## 2023-12-06 PROCEDURE — 3074F SYST BP LT 130 MM HG: CPT | Mod: CPTII,,, | Performed by: FAMILY MEDICINE

## 2023-12-06 PROCEDURE — 87086 URINE CULTURE/COLONY COUNT: CPT | Mod: ,,, | Performed by: CLINICAL MEDICAL LABORATORY

## 2023-12-06 PROCEDURE — 1159F MED LIST DOCD IN RCRD: CPT | Mod: CPTII,,, | Performed by: FAMILY MEDICINE

## 2023-12-06 PROCEDURE — 99213 OFFICE O/P EST LOW 20 MIN: CPT | Mod: 25,,, | Performed by: FAMILY MEDICINE

## 2023-12-06 PROCEDURE — 3074F PR MOST RECENT SYSTOLIC BLOOD PRESSURE < 130 MM HG: ICD-10-PCS | Mod: CPTII,,, | Performed by: FAMILY MEDICINE

## 2023-12-06 PROCEDURE — 3008F BODY MASS INDEX DOCD: CPT | Mod: CPTII,,, | Performed by: FAMILY MEDICINE

## 2023-12-06 PROCEDURE — 81003 URINALYSIS AUTO W/O SCOPE: CPT | Mod: RHCUB | Performed by: FAMILY MEDICINE

## 2023-12-06 PROCEDURE — 96372 PR INJECTION,THERAP/PROPH/DIAG2ST, IM OR SUBCUT: ICD-10-PCS | Mod: ,,, | Performed by: FAMILY MEDICINE

## 2023-12-06 PROCEDURE — 1160F PR REVIEW ALL MEDS BY PRESCRIBER/CLIN PHARMACIST DOCUMENTED: ICD-10-PCS | Mod: CPTII,,, | Performed by: FAMILY MEDICINE

## 2023-12-06 PROCEDURE — 3008F PR BODY MASS INDEX (BMI) DOCUMENTED: ICD-10-PCS | Mod: CPTII,,, | Performed by: FAMILY MEDICINE

## 2023-12-06 PROCEDURE — 96372 THER/PROPH/DIAG INJ SC/IM: CPT | Mod: ,,, | Performed by: FAMILY MEDICINE

## 2023-12-06 PROCEDURE — 87086 CULTURE, URINE: ICD-10-PCS | Mod: ,,, | Performed by: CLINICAL MEDICAL LABORATORY

## 2023-12-06 RX ORDER — KETOROLAC TROMETHAMINE 30 MG/ML
30 INJECTION, SOLUTION INTRAMUSCULAR; INTRAVENOUS
Status: COMPLETED | OUTPATIENT
Start: 2023-12-06 | End: 2023-12-06

## 2023-12-06 RX ORDER — LEVOFLOXACIN 500 MG/1
500 TABLET, FILM COATED ORAL DAILY
Qty: 5 TABLET | Refills: 0 | Status: SHIPPED | OUTPATIENT
Start: 2023-12-06 | End: 2024-01-18

## 2023-12-06 RX ADMIN — KETOROLAC TROMETHAMINE 30 MG: 30 INJECTION, SOLUTION INTRAMUSCULAR; INTRAVENOUS at 03:12

## 2023-12-06 NOTE — PROGRESS NOTES
New Clinic Note    Leti Landeros is a 58 y.o. female     CC:   Chief Complaint   Patient presents with    Foot Pain     Patient stated she dropped a ridge type pot onto her right foot at [employer] and was seen in the ER at [facility] and given Toradol and Xray of foot which was negative  She was to follow up with  Dr Efe Singh her PCP two days later. She stated he did not Xray her foot again  because it was Xray'd in the ER and was negative.  Able to work and ambulate on this foot since November without any type of brace but stated it is still sore and painful. Was told she had a contusion in the ER last month.     Did not bring her medications in for review     Provided with a current list of her medications from [facility] chart to review during this visit because she did not bring her medications in for review. Stated some were correct and some were not. Her PCP is Dr Efe Singh.         Subjective    History of Present Illness    Patient injured her foot by dropping a ridge pot onto the top of her right foot on 11/4/23 while at [employer]. Patient went to [facility] ER and her Xray was normal. She was diagnosed with a contusion of her foot. She has taken ibuprofen with some relief. She is able to bear weight on her foot but still complains of pain.    Patient complains of dysuria and urine frequency. Patient is scheduled to see a urologist In January. She denies fever.     Current Outpatient Medications:     aluminum chloride (DRYSOL DAB-O-MATIC) 20 % external solution, Apply to underarms daily, Disp: 60 mL, Rfl: 11    atenoloL-chlorthalidone (TENORETIC) 100-25 mg per tablet, Take 1 tablet by mouth once daily., Disp: , Rfl:     atorvastatin (LIPITOR) 20 MG tablet, Take 20 mg by mouth every evening., Disp: , Rfl:     blood sugar diagnostic (TRUE METRIX GLUCOSE TEST STRIP) Strp, USE ONE STRIP TO CHECK GLUCOSE TWICE DAILY, Disp: 50 strip, Rfl: 11    chlorhexidine (HIBICLENS) 4 % external liquid, Use as a daily wash  to affected areas, Disp: 473 mL, Rfl: 11    clindamycin (CLEOCIN T) 1 % lotion, Apply thin layer to affected areas daily after shower, Disp: 60 mL, Rfl: 11    ergocalciferol (ERGOCALCIFEROL) 50,000 unit Cap, , Disp: , Rfl:     esomeprazole (NEXIUM) 40 MG capsule, Take 40 mg by mouth once daily., Disp: , Rfl:     HYDROcodone-acetaminophen (NORCO)  mg per tablet, , Disp: , Rfl:     JARDIANCE 10 mg tablet, Take 10 mg by mouth every morning., Disp: , Rfl:     levoFLOXacin (LEVAQUIN) 500 MG tablet, Take 1 tablet (500 mg total) by mouth once daily., Disp: 5 tablet, Rfl: 0    MOUNJARO 12.5 mg/0.5 mL PnIj, SMARTSI.5 Milligram(s) SUB-Q Once a Week, Disp: , Rfl:     potassium citrate (UROCIT-K) 10 mEq (1,080 mg) TbSR, Take 20 mEq by mouth., Disp: , Rfl:     tamsulosin (FLOMAX) 0.4 mg Cap, Take 0.4 mg by mouth., Disp: , Rfl:     TRESIBA FLEXTOUCH U-100 100 unit/mL (3 mL) insulin pen, SMARTSIG:15 Unit(s) SUB-Q Every Morning, Disp: , Rfl:      Past Medical History:   Diagnosis Date    Diabetes mellitus     Fibromyalgia     Hypertension     Scleroderma     Sinusitis         Family History   Problem Relation Age of Onset    Diabetes Mother     Cancer Father         Past Surgical History:   Procedure Laterality Date     SECTION      DILATION AND CURETTAGE OF UTERUS      TONSILLECTOMY, ADENOIDECTOMY          Social History     Socioeconomic History    Marital status:    Tobacco Use    Smoking status: Never     Passive exposure: Never    Smokeless tobacco: Never   Substance and Sexual Activity    Alcohol use: Not Currently    Drug use: Never        Review of Systems   Constitutional:  Negative for fatigue and fever.   HENT:  Negative for ear pain, postnasal drip, rhinorrhea and sinus pressure/congestion.    Respiratory:  Negative for cough and shortness of breath.    Cardiovascular:  Negative for chest pain.   Gastrointestinal:  Negative for abdominal pain, diarrhea, nausea and vomiting.   Genitourinary:   "Negative for dysuria.   Neurological:  Negative for headaches.        /73 (BP Location: Left arm, Patient Position: Sitting, BP Method: Large (Automatic))   Pulse 88   Temp 98.4 °F (36.9 °C) (Oral)   Resp 18   Ht 5' 4" (1.626 m)   Wt 69.4 kg (153 lb)   SpO2 98%   BMI 26.26 kg/m²      Physical Exam  HENT:      Head: Normocephalic and atraumatic.   Cardiovascular:      Rate and Rhythm: Normal rate and regular rhythm.   Pulmonary:      Effort: Pulmonary effort is normal.      Breath sounds: Normal breath sounds.   Musculoskeletal:      Right foot: Normal range of motion. Tenderness present. No swelling.      Comments: Patient complains of tenderness to palpation over entire foot.    Neurological:      Mental Status: She is alert and oriented to person, place, and time.   Psychiatric:         Mood and Affect: Mood normal.         Behavior: Behavior normal.          Assessment and Plan      ICD-10-CM ICD-9-CM   1. Right foot pain  M79.671 729.5   2. Dysuria  R30.0 788.1   3. Urinary tract infection with hematuria, site unspecified  N39.0 599.0    R31.9 599.70        1. Right foot pain  Not controlled. Will refer to ortho. Toradol for pain.  -     Ambulatory referral/consult to Orthopedics; Future; Expected date: 12/06/2023  -     ketorolac injection 30 mg    2. Dysuria  -     POCT URINALYSIS W/O SCOPE  -     Urine culture    3. Urinary tract infection with hematuria, site unspecified  Treat with levaquin. Send urine for a culture.   -     levoFLOXacin (LEVAQUIN) 500 MG tablet; Take 1 tablet (500 mg total) by mouth once daily.  Dispense: 5 tablet; Refill: 0        Follow up if symptoms worsen or fail to improve.     This information was created by a scribe under my supervision and in my presence. I have reviewed and agree with the scribe's documentation.    "

## 2023-12-08 LAB — UA COMPLETE W REFLEX CULTURE PNL UR: NO GROWTH

## 2024-01-04 DIAGNOSIS — M79.671 RIGHT FOOT PAIN: Primary | ICD-10-CM

## 2024-01-08 ENCOUNTER — HOSPITAL ENCOUNTER (OUTPATIENT)
Dept: RADIOLOGY | Facility: HOSPITAL | Age: 59
Discharge: HOME | End: 2024-01-08
Attending: ORTHOPAEDIC SURGERY
Payer: MEDICAID

## 2024-01-08 ENCOUNTER — OFFICE VISIT (OUTPATIENT)
Dept: ORTHOPEDICS | Facility: CLINIC | Age: 59
End: 2024-01-08
Payer: MEDICAID

## 2024-01-08 DIAGNOSIS — M79.671 RIGHT FOOT PAIN: ICD-10-CM

## 2024-01-08 PROCEDURE — 1159F MED LIST DOCD IN RCRD: CPT | Mod: CPTII,,, | Performed by: ORTHOPAEDIC SURGERY

## 2024-01-08 PROCEDURE — 1160F RVW MEDS BY RX/DR IN RCRD: CPT | Mod: CPTII,,, | Performed by: ORTHOPAEDIC SURGERY

## 2024-01-08 PROCEDURE — 99213 OFFICE O/P EST LOW 20 MIN: CPT | Mod: PBBFAC | Performed by: ORTHOPAEDIC SURGERY

## 2024-01-08 PROCEDURE — 73630 X-RAY EXAM OF FOOT: CPT | Mod: TC,RT

## 2024-01-08 PROCEDURE — 73630 X-RAY EXAM OF FOOT: CPT | Mod: 26,RT,, | Performed by: ORTHOPAEDIC SURGERY

## 2024-01-08 PROCEDURE — 99204 OFFICE O/P NEW MOD 45 MIN: CPT | Mod: S$PBB,,, | Performed by: ORTHOPAEDIC SURGERY

## 2024-01-08 NOTE — PROGRESS NOTES
CLINIC NOTE       Chief Complaint   Patient presents with    Right Foot - Pain, Injury        Leti Landeros is a 58 y.o. female seen today for evaluation of right foot pain.  She describes being struck of the dorsum of the foot with a fall on flower pot 2023.  She describes diffuse pain involving the lower leg ankle and foot.  She recently started using crutches.  She is otherwise ambulating full weight-bearing.  No history of prior ankle or foot problems.  There is a history of AODM type 2 on Jardiance.      Past Medical History:   Diagnosis Date    Diabetes mellitus     Fibromyalgia     Hypertension     Scleroderma     Sinusitis      Family History   Problem Relation Age of Onset    Diabetes Mother     Cancer Father      Current Outpatient Medications on File Prior to Visit   Medication Sig Dispense Refill    aluminum chloride (DRYSOL DAB-O-MATIC) 20 % external solution Apply to underarms daily 60 mL 11    atenoloL-chlorthalidone (TENORETIC) 100-25 mg per tablet Take 1 tablet by mouth once daily.      atorvastatin (LIPITOR) 20 MG tablet Take 20 mg by mouth every evening.      blood sugar diagnostic (TRUE METRIX GLUCOSE TEST STRIP) Strp USE ONE STRIP TO CHECK GLUCOSE TWICE DAILY 50 strip 11    chlorhexidine (HIBICLENS) 4 % external liquid Use as a daily wash to affected areas 473 mL 11    clindamycin (CLEOCIN T) 1 % lotion Apply thin layer to affected areas daily after shower 60 mL 11    ergocalciferol (ERGOCALCIFEROL) 50,000 unit Cap       esomeprazole (NEXIUM) 40 MG capsule Take 40 mg by mouth once daily.      HYDROcodone-acetaminophen (NORCO)  mg per tablet       JARDIANCE 10 mg tablet Take 10 mg by mouth every morning.      levoFLOXacin (LEVAQUIN) 500 MG tablet Take 1 tablet (500 mg total) by mouth once daily. 5 tablet 0    MOUNJARO 12.5 mg/0.5 mL PnIj SMARTSI.5 Milligram(s) SUB-Q Once a Week      potassium citrate (UROCIT-K) 10 mEq (1,080 mg) TbSR Take 20 mEq by mouth.       tamsulosin (FLOMAX) 0.4 mg Cap Take 0.4 mg by mouth.      TRESIBA FLEXTOUCH U-100 100 unit/mL (3 mL) insulin pen SMARTSIG:15 Unit(s) SUB-Q Every Morning      [DISCONTINUED] LANTUS SOLOSTAR U-100 INSULIN glargine 100 units/mL (3mL) SubQ pen INJECT 50 UNITS UNDER THE SKIN EVERY DAY       No current facility-administered medications on file prior to visit.       ROS     There were no vitals filed for this visit.    Past Surgical History:   Procedure Laterality Date     SECTION      DILATION AND CURETTAGE OF UTERUS      TONSILLECTOMY, ADENOIDECTOMY          Review of patient's allergies indicates:   Allergen Reactions    Adhesive tape-silicones     Corticosteroids (glucocorticoids)     Lincocin [lincomycin]     Promethazine hcl     Sulfa (sulfonamide antibiotics)      Patient is also allergic to an unknown steroid drug    Methocarbamol Nausea And Vomiting        Ortho Exam :  Well-developed well-nourished black female no acute distress.  Alert oriented cooperative.  Neck is supple without JVD.  Breathing is regular nonlabored.  She is he was skin is warm and dry no lesions seen.  Exam of the right leg ankle and foot normal contour.  No soft tissue swelling or ecchymosis.  Anterior drawer test normal.  She was nonspecific diffuse tenderness palpation about the lower leg ankle and foot.    Radiographic Examination:  Right foot 2024    Technique:  Three views AP lateral oblique    Findings:  Bones well mineralized.  Ankle and foot joints are normally aligned.  There has an accessory ossicle adjacent to the cuboid.    Impression:   See Above    Assessment and Plan  Patient Active Problem List    Diagnosis Date Noted    Type 2 diabetes mellitus without complication, with long-term current use of insulin 2022   Impression:  Discussed with the patient possibility of causalgia he is there is no evidence of obvious bony or soft tissue injury.  Plan:  Cam walker boot issued.  If symptoms persist recommend  referral to Pain Center.        Devante León M.D.

## 2024-01-17 ENCOUNTER — OFFICE VISIT (OUTPATIENT)
Dept: FAMILY MEDICINE | Facility: CLINIC | Age: 59
End: 2024-01-17
Payer: MEDICAID

## 2024-01-17 VITALS
BODY MASS INDEX: 21.18 KG/M2 | OXYGEN SATURATION: 100 % | DIASTOLIC BLOOD PRESSURE: 69 MMHG | RESPIRATION RATE: 16 BRPM | TEMPERATURE: 98 F | HEART RATE: 100 BPM | WEIGHT: 143 LBS | HEIGHT: 69 IN | SYSTOLIC BLOOD PRESSURE: 104 MMHG

## 2024-01-17 DIAGNOSIS — J34.89 SINUS DRAINAGE: ICD-10-CM

## 2024-01-17 DIAGNOSIS — R42 DIZZINESS: Primary | ICD-10-CM

## 2024-01-17 DIAGNOSIS — Z20.822 CONTACT WITH AND (SUSPECTED) EXPOSURE TO COVID-19: ICD-10-CM

## 2024-01-17 DIAGNOSIS — G56.03 BILATERAL CARPAL TUNNEL SYNDROME: ICD-10-CM

## 2024-01-17 LAB
ALBUMIN SERPL BCP-MCNC: 4.1 G/DL (ref 3.5–5)
ALBUMIN/GLOB SERPL: 1 {RATIO}
ALP SERPL-CCNC: 69 U/L (ref 46–118)
ALT SERPL W P-5'-P-CCNC: 36 U/L (ref 13–56)
ANION GAP SERPL CALCULATED.3IONS-SCNC: 10 MMOL/L (ref 7–16)
AST SERPL W P-5'-P-CCNC: 17 U/L (ref 15–37)
BASOPHILS # BLD AUTO: 0.05 K/UL (ref 0–0.2)
BASOPHILS NFR BLD AUTO: 0.5 % (ref 0–1)
BILIRUB SERPL-MCNC: 0.4 MG/DL (ref ?–1.2)
BUN SERPL-MCNC: 16 MG/DL (ref 7–18)
BUN/CREAT SERPL: 16 (ref 6–20)
CALCIUM SERPL-MCNC: 10.6 MG/DL (ref 8.5–10.1)
CHLORIDE SERPL-SCNC: 102 MMOL/L (ref 98–107)
CO2 SERPL-SCNC: 30 MMOL/L (ref 21–32)
CREAT SERPL-MCNC: 1.01 MG/DL (ref 0.55–1.02)
CTP QC/QA: YES
DIFFERENTIAL METHOD BLD: ABNORMAL
EGFR (NO RACE VARIABLE) (RUSH/TITUS): 65 ML/MIN/1.73M2
EOSINOPHIL # BLD AUTO: 0.14 K/UL (ref 0–0.5)
EOSINOPHIL NFR BLD AUTO: 1.5 % (ref 1–4)
ERYTHROCYTE [DISTWIDTH] IN BLOOD BY AUTOMATED COUNT: 14.5 % (ref 11.5–14.5)
GLOBULIN SER-MCNC: 4.3 G/DL (ref 2–4)
GLUCOSE SERPL-MCNC: 166 MG/DL (ref 74–106)
HCT VFR BLD AUTO: 47.7 % (ref 38–47)
HGB BLD-MCNC: 15 G/DL (ref 12–16)
IMM GRANULOCYTES # BLD AUTO: 0.01 K/UL (ref 0–0.04)
IMM GRANULOCYTES NFR BLD: 0.1 % (ref 0–0.4)
LYMPHOCYTES # BLD AUTO: 3.97 K/UL (ref 1–4.8)
LYMPHOCYTES NFR BLD AUTO: 41.8 % (ref 27–41)
MCH RBC QN AUTO: 27.1 PG (ref 27–31)
MCHC RBC AUTO-ENTMCNC: 31.4 G/DL (ref 32–36)
MCV RBC AUTO: 86.3 FL (ref 80–96)
MONOCYTES # BLD AUTO: 0.85 K/UL (ref 0–0.8)
MONOCYTES NFR BLD AUTO: 9 % (ref 2–6)
MPC BLD CALC-MCNC: 10.6 FL (ref 9.4–12.4)
NEUTROPHILS # BLD AUTO: 4.47 K/UL (ref 1.8–7.7)
NEUTROPHILS NFR BLD AUTO: 47.1 % (ref 53–65)
NRBC # BLD AUTO: 0 X10E3/UL
NRBC, AUTO (.00): 0 %
PLATELET # BLD AUTO: 354 K/UL (ref 150–400)
POTASSIUM SERPL-SCNC: 2.9 MMOL/L (ref 3.5–5.1)
PROT SERPL-MCNC: 8.4 G/DL (ref 6.4–8.2)
RBC # BLD AUTO: 5.53 M/UL (ref 4.2–5.4)
SARS-COV-2 RDRP RESP QL NAA+PROBE: NEGATIVE
SODIUM SERPL-SCNC: 139 MMOL/L (ref 136–145)
TSH SERPL DL<=0.005 MIU/L-ACNC: 1.22 UIU/ML (ref 0.36–3.74)
WBC # BLD AUTO: 9.49 K/UL (ref 4.5–11)

## 2024-01-17 PROCEDURE — 99214 OFFICE O/P EST MOD 30 MIN: CPT | Mod: 25,,, | Performed by: FAMILY MEDICINE

## 2024-01-17 PROCEDURE — 87635 SARS-COV-2 COVID-19 AMP PRB: CPT | Mod: RHCUB | Performed by: FAMILY MEDICINE

## 2024-01-17 PROCEDURE — 3008F BODY MASS INDEX DOCD: CPT | Mod: CPTII,,, | Performed by: FAMILY MEDICINE

## 2024-01-17 PROCEDURE — 96372 THER/PROPH/DIAG INJ SC/IM: CPT | Mod: ,,, | Performed by: FAMILY MEDICINE

## 2024-01-17 PROCEDURE — 80053 COMPREHEN METABOLIC PANEL: CPT | Mod: ,,, | Performed by: CLINICAL MEDICAL LABORATORY

## 2024-01-17 PROCEDURE — 1159F MED LIST DOCD IN RCRD: CPT | Mod: CPTII,,, | Performed by: FAMILY MEDICINE

## 2024-01-17 PROCEDURE — 84443 ASSAY THYROID STIM HORMONE: CPT | Mod: ,,, | Performed by: CLINICAL MEDICAL LABORATORY

## 2024-01-17 PROCEDURE — 3074F SYST BP LT 130 MM HG: CPT | Mod: CPTII,,, | Performed by: FAMILY MEDICINE

## 2024-01-17 PROCEDURE — 3078F DIAST BP <80 MM HG: CPT | Mod: CPTII,,, | Performed by: FAMILY MEDICINE

## 2024-01-17 PROCEDURE — 85025 COMPLETE CBC W/AUTO DIFF WBC: CPT | Mod: ,,, | Performed by: CLINICAL MEDICAL LABORATORY

## 2024-01-17 PROCEDURE — 1160F RVW MEDS BY RX/DR IN RCRD: CPT | Mod: CPTII,,, | Performed by: FAMILY MEDICINE

## 2024-01-17 RX ORDER — MECLIZINE HYDROCHLORIDE 25 MG/1
25 TABLET ORAL 3 TIMES DAILY PRN
Qty: 30 TABLET | Refills: 2 | Status: SHIPPED | OUTPATIENT
Start: 2024-01-17

## 2024-01-17 RX ORDER — KETOROLAC TROMETHAMINE 30 MG/ML
30 INJECTION, SOLUTION INTRAMUSCULAR; INTRAVENOUS
Status: COMPLETED | OUTPATIENT
Start: 2024-01-17 | End: 2024-01-17

## 2024-01-17 RX ADMIN — KETOROLAC TROMETHAMINE 30 MG: 30 INJECTION, SOLUTION INTRAMUSCULAR; INTRAVENOUS at 05:01

## 2024-01-17 NOTE — LETTER
January 17, 2024      [facility]  [address] [place] [address]  Phone: [phone]  Fax: [phone]       Patient: Leti Landeros   YOB: 1965  Date of Visit: 01/17/2024    To Whom It May Concern:    Saida Landeros  was at [facility] on 01/17/2024. Please excuse patient for missing on 01/16/2024 through 01/18/2024. The patient may return to work/school on 01/19/2024 with no restrictions. If you have any questions or concerns, or if I can be of further assistance, please do not hesitate to contact me.    Sincerely,    Letty Lacy MA

## 2024-01-17 NOTE — PROGRESS NOTES
New Clinic Note    Leti Landeros is a 58 y.o. female     CC:   Chief Complaint   Patient presents with    Dizziness     Patient has had dizziness for a few weeks. She states she had an episode Saturday 01/13/2024 where she blacked out at feel up against the fridge in the kitchen. She also states she has a history of anemia and wants ab work done.     Wrist Pain     Patient states she has pinched nerves and carpal tunnel in both hands and wrist. She is having a lot of pain with this right now. She just wants ton get a shot to help with the pain.     Sinus Problem     Patient has had sinus drainage and congestion for several weeks now. She said she has also been exposed to covid and wants to be tested for this.         Subjective    History of Present Illness    Patient complains of dizziness for several months. She has not taken anything for her symptoms. She said she fell after one of her spells. She reports that it occurs when she tries to change position. She has used saline spray for her sinus congestion with some relief. She was exposed to COVID and wants to be tested. She complains that her carpal tunnel pain is flaring up. She wants a shot.     Current Outpatient Medications:     aluminum chloride (DRYSOL DAB-O-MATIC) 20 % external solution, Apply to underarms daily, Disp: 60 mL, Rfl: 11    chlorhexidine (HIBICLENS) 4 % external liquid, Use as a daily wash to affected areas, Disp: 473 mL, Rfl: 11    clindamycin (CLEOCIN T) 1 % lotion, Apply thin layer to affected areas daily after shower, Disp: 60 mL, Rfl: 11    ergocalciferol (ERGOCALCIFEROL) 50,000 unit Cap, Take 50,000 Units by mouth every 7 days., Disp: , Rfl:     esomeprazole (NEXIUM) 40 MG capsule, Take 40 mg by mouth once daily., Disp: , Rfl:     HYDROcodone-acetaminophen (NORCO)  mg per tablet, , Disp: , Rfl:     potassium citrate (UROCIT-K) 10 mEq (1,080 mg) TbSR, Take 20 mEq by mouth., Disp: , Rfl:     tamsulosin (FLOMAX) 0.4 mg Cap, Take  "0.4 mg by mouth., Disp: , Rfl:     atenoloL-chlorthalidone (TENORETIC) 50-25 mg Tab, Take 1 tablet by mouth once daily., Disp: , Rfl:     empagliflozin (JARDIANCE) 25 mg tablet, Take 25 mg by mouth once daily., Disp: , Rfl:     meclizine (ANTIVERT) 25 mg tablet, Take 1 tablet (25 mg total) by mouth 3 (three) times daily as needed for Dizziness., Disp: 30 tablet, Rfl: 2    MOUNJARO 15 mg/0.5 mL PnIj, Inject 15 mg into the skin once a week., Disp: , Rfl:   No current facility-administered medications for this visit.     Past Medical History:   Diagnosis Date    Diabetes mellitus     Fibromyalgia     Hypertension     Scleroderma     Sinusitis         Family History   Problem Relation Age of Onset    Diabetes Mother     Cancer Father         Past Surgical History:   Procedure Laterality Date     SECTION      DILATION AND CURETTAGE OF UTERUS      TONSILLECTOMY, ADENOIDECTOMY          Review of Systems   Constitutional:  Negative for fatigue and fever.   HENT:  Negative for ear pain, postnasal drip, rhinorrhea and sinus pressure/congestion.    Respiratory:  Negative for cough and shortness of breath.    Cardiovascular:  Negative for chest pain.   Gastrointestinal:  Negative for abdominal pain, diarrhea, nausea and vomiting.   Genitourinary:  Negative for dysuria.   Neurological:  Positive for dizziness. Negative for headaches.        /69 (BP Location: Left arm, Patient Position: Standing)   Pulse 100   Temp 98 °F (36.7 °C) (Oral)   Resp 16   Ht 5' 9.4" (1.763 m)   Wt 64.9 kg (143 lb)   SpO2 100%   BMI 20.87 kg/m²      Physical Exam  HENT:      Head: Normocephalic and atraumatic.      Right Ear: Tympanic membrane, ear canal and external ear normal.      Left Ear: Tympanic membrane, ear canal and external ear normal.   Cardiovascular:      Rate and Rhythm: Normal rate and regular rhythm.   Pulmonary:      Effort: Pulmonary effort is normal.      Breath sounds: Normal breath sounds.   Neurological:      " Mental Status: She is alert and oriented to person, place, and time.   Psychiatric:         Mood and Affect: Mood normal.         Behavior: Behavior normal.          Assessment and Plan      ICD-10-CM ICD-9-CM   1. Dizziness  R42 780.4   2. Sinus drainage  J34.89 478.19   3. Contact with and (suspected) exposure to covid-19  Z20.822 V01.79   4. Bilateral carpal tunnel syndrome  G56.03 354.0        1. Dizziness  Not controlled. Start on meclizine. Will check labs.   -     CBC Auto Differential; Future; Expected date: 01/17/2024  -     Comprehensive Metabolic Panel; Future; Expected date: 01/17/2024  -     TSH; Future; Expected date: 01/17/2024  -     meclizine (ANTIVERT) 25 mg tablet; Take 1 tablet (25 mg total) by mouth 3 (three) times daily as needed for Dizziness.  Dispense: 30 tablet; Refill: 2    2. Sinus drainage  Test is negaitve   -     POCT COVID-19 Rapid Screening    3. Contact with and (suspected) exposure to covid-19  -     POCT COVID-19 Rapid Screening    4. Bilateral carpal tunnel syndrome  -     ketorolac injection 30 mg        Follow up if symptoms worsen or fail to improve.

## 2024-01-18 ENCOUNTER — OFFICE VISIT (OUTPATIENT)
Dept: GASTROENTEROLOGY | Facility: CLINIC | Age: 59
End: 2024-01-18
Payer: MEDICAID

## 2024-01-18 VITALS
SYSTOLIC BLOOD PRESSURE: 108 MMHG | BODY MASS INDEX: 24.66 KG/M2 | HEART RATE: 73 BPM | WEIGHT: 148 LBS | DIASTOLIC BLOOD PRESSURE: 69 MMHG | HEIGHT: 65 IN | OXYGEN SATURATION: 99 %

## 2024-01-18 DIAGNOSIS — R10.13 EPIGASTRIC PAIN: ICD-10-CM

## 2024-01-18 DIAGNOSIS — K21.9 GASTROESOPHAGEAL REFLUX DISEASE, UNSPECIFIED WHETHER ESOPHAGITIS PRESENT: Primary | ICD-10-CM

## 2024-01-18 DIAGNOSIS — K44.9 HIATAL HERNIA: ICD-10-CM

## 2024-01-18 PROCEDURE — 99215 OFFICE O/P EST HI 40 MIN: CPT | Mod: S$PBB,,,

## 2024-01-18 PROCEDURE — 3074F SYST BP LT 130 MM HG: CPT | Mod: CPTII,,,

## 2024-01-18 PROCEDURE — 1160F RVW MEDS BY RX/DR IN RCRD: CPT | Mod: CPTII,,,

## 2024-01-18 PROCEDURE — 3078F DIAST BP <80 MM HG: CPT | Mod: CPTII,,,

## 2024-01-18 PROCEDURE — 3008F BODY MASS INDEX DOCD: CPT | Mod: CPTII,,,

## 2024-01-18 PROCEDURE — 99215 OFFICE O/P EST HI 40 MIN: CPT | Mod: PBBFAC

## 2024-01-18 PROCEDURE — 1159F MED LIST DOCD IN RCRD: CPT | Mod: CPTII,,,

## 2024-01-18 RX ORDER — ATENOLOL AND CHLORTHALIDONE TABLET 50; 25 MG/1; MG/1
1 TABLET ORAL DAILY
COMMUNITY
Start: 2023-12-06

## 2024-01-18 RX ORDER — TIRZEPATIDE 15 MG/.5ML
15 INJECTION, SOLUTION SUBCUTANEOUS WEEKLY
COMMUNITY
Start: 2023-12-12

## 2024-01-18 RX ORDER — POTASSIUM CHLORIDE 20 MEQ/1
20 TABLET, EXTENDED RELEASE ORAL 2 TIMES DAILY
Qty: 6 TABLET | Refills: 0 | Status: SHIPPED | OUTPATIENT
Start: 2024-01-18

## 2024-01-22 DIAGNOSIS — E87.6 HYPOKALEMIA: Primary | ICD-10-CM

## 2024-02-09 DIAGNOSIS — E53.8 B12 DEFICIENCY: Primary | ICD-10-CM

## 2024-02-09 DIAGNOSIS — R42 DIZZINESS: Primary | ICD-10-CM

## 2024-02-09 RX ORDER — ERGOCALCIFEROL 1.25 MG/1
50000 CAPSULE ORAL
Qty: 4 CAPSULE | Refills: 5 | Status: SHIPPED | OUTPATIENT
Start: 2024-02-09

## 2024-03-25 ENCOUNTER — TELEPHONE (OUTPATIENT)
Dept: ORTHOPEDICS | Facility: CLINIC | Age: 59
End: 2024-03-25
Payer: MEDICAID

## 2024-03-25 NOTE — TELEPHONE ENCOUNTER
3/25/2024 @0945  Spoke with pt. Pt states she wants an MRI,  told her at her last appt he shalini refer her to pain management if problem persists. She isnt interested in pain treatment. We decided it was best for her to come back in to se him. I scheduled her an appt for 3/27 @ 1:40PM. Patient voices understanding.

## 2024-03-25 NOTE — TELEPHONE ENCOUNTER
----- Message from Olamide Gutiérrez sent at 3/25/2024  9:10 AM CDT -----  Pt states she needs an MRI done of right foot and leg. She is not doing any better. When she walks, feels like a bone sticking her. Call [phone].

## 2024-03-26 DIAGNOSIS — M79.671 RIGHT FOOT PAIN: Primary | ICD-10-CM

## 2024-03-27 ENCOUNTER — HOSPITAL ENCOUNTER (OUTPATIENT)
Dept: RADIOLOGY | Facility: HOSPITAL | Age: 59
Discharge: HOME | End: 2024-03-27
Attending: ORTHOPAEDIC SURGERY
Payer: MEDICAID

## 2024-03-27 ENCOUNTER — OFFICE VISIT (OUTPATIENT)
Dept: ORTHOPEDICS | Facility: CLINIC | Age: 59
End: 2024-03-27
Payer: MEDICAID

## 2024-03-27 DIAGNOSIS — M79.671 RIGHT FOOT PAIN: Primary | ICD-10-CM

## 2024-03-27 DIAGNOSIS — M79.671 RIGHT FOOT PAIN: ICD-10-CM

## 2024-03-27 PROCEDURE — 1160F RVW MEDS BY RX/DR IN RCRD: CPT | Mod: CPTII,,, | Performed by: ORTHOPAEDIC SURGERY

## 2024-03-27 PROCEDURE — 73630 X-RAY EXAM OF FOOT: CPT | Mod: TC,RT

## 2024-03-27 PROCEDURE — 1159F MED LIST DOCD IN RCRD: CPT | Mod: CPTII,,, | Performed by: ORTHOPAEDIC SURGERY

## 2024-03-27 PROCEDURE — 99213 OFFICE O/P EST LOW 20 MIN: CPT | Mod: PBBFAC,25 | Performed by: ORTHOPAEDIC SURGERY

## 2024-03-27 PROCEDURE — 99214 OFFICE O/P EST MOD 30 MIN: CPT | Mod: S$PBB,,, | Performed by: ORTHOPAEDIC SURGERY

## 2024-03-27 NOTE — PROGRESS NOTES
CLINIC NOTE       Chief Complaint   Patient presents with    Right Foot - Pain        Leti Landeros is a 58 y.o. female seen today for recheck of her right foot.  She continues to describe diffuse pain symptoms.  She does have history of AODM type 2.  Previously discussed possibility of pain syndrome.  We discussed referral to the pain center but she was declined.  She was insistent on obtaining an MRI examination.  She continues to ambulate full weight-bearing with a cam walker boot.      Past Medical History:   Diagnosis Date    Diabetes mellitus     Fibromyalgia     Hypertension     Scleroderma     Sinusitis      Family History   Problem Relation Age of Onset    Diabetes Mother     Cancer Father      Current Outpatient Medications on File Prior to Visit   Medication Sig Dispense Refill    aluminum chloride (DRYSOL DAB-O-MATIC) 20 % external solution Apply to underarms daily 60 mL 11    atenoloL-chlorthalidone (TENORETIC) 50-25 mg Tab Take 1 tablet by mouth once daily.      chlorhexidine (HIBICLENS) 4 % external liquid Use as a daily wash to affected areas 473 mL 11    clindamycin (CLEOCIN T) 1 % lotion Apply thin layer to affected areas daily after shower 60 mL 11    empagliflozin (JARDIANCE) 25 mg tablet Take 25 mg by mouth once daily.      ergocalciferol (ERGOCALCIFEROL) 50,000 unit Cap Take 1 capsule (50,000 Units total) by mouth every 7 days. 4 capsule 5    esomeprazole (NEXIUM) 40 MG capsule Take 40 mg by mouth once daily.      HYDROcodone-acetaminophen (NORCO)  mg per tablet       meclizine (ANTIVERT) 25 mg tablet Take 1 tablet (25 mg total) by mouth 3 (three) times daily as needed for Dizziness. 30 tablet 2    MOUNJARO 15 mg/0.5 mL PnIj Inject 15 mg into the skin once a week.      potassium chloride SA (K-DUR,KLOR-CON) 20 MEQ tablet Take 1 tablet (20 mEq total) by mouth 2 (two) times daily. 6 tablet 0    potassium citrate (UROCIT-K) 10 mEq (1,080 mg) TbSR Take 20 mEq by mouth.       tamsulosin (FLOMAX) 0.4 mg Cap Take 0.4 mg by mouth.      [DISCONTINUED] LANTUS SOLOSTAR U-100 INSULIN glargine 100 units/mL (3mL) SubQ pen INJECT 50 UNITS UNDER THE SKIN EVERY DAY       No current facility-administered medications on file prior to visit.       ROS     There were no vitals filed for this visit.    Past Surgical History:   Procedure Laterality Date     SECTION      DILATION AND CURETTAGE OF UTERUS      TONSILLECTOMY, ADENOIDECTOMY          Review of patient's allergies indicates:   Allergen Reactions    Adhesive tape-silicones     Corticosteroids (glucocorticoids)     Lincocin [lincomycin]     Promethazine hcl     Sulfa (sulfonamide antibiotics)      Patient is also allergic to an unknown steroid drug    Methocarbamol Nausea And Vomiting        Ortho Exam right foot normal color and configuration.    Radiographic Examination:    Technique:    Findings:    Impression:   See Above    Assessment and Plan  Patient Active Problem List    Diagnosis Date Noted    Bilateral carpal tunnel syndrome 2024    Type 2 diabetes mellitus without complication, with long-term current use of insulin 2022    Impression:  Complex regional pain syndrome versus peripheral neuropathy.    Plan:  We will schedule MRI examination right foot recheck with results.      Devante León M.D.

## 2024-04-11 ENCOUNTER — TELEPHONE (OUTPATIENT)
Dept: ORTHOPEDICS | Facility: CLINIC | Age: 59
End: 2024-04-11
Payer: MEDICAID

## 2024-04-11 DIAGNOSIS — M79.671 RIGHT FOOT PAIN: Primary | ICD-10-CM

## 2024-04-11 NOTE — TELEPHONE ENCOUNTER
4/11/24 @ 7655 called and spoke with pt. Pt wants to try physical therapy at [facility] in [facility] will fax order

## 2024-04-11 NOTE — TELEPHONE ENCOUNTER
----- Message from Caryl Thrasher sent at 4/11/2024 11:40 AM CDT -----  Melissa with [facility] rehab calling to let staff know they are not in network with medicaid ins

## 2024-04-11 NOTE — TELEPHONE ENCOUNTER
----- Message from Meg Greene sent at 4/11/2024  9:55 AM CDT -----  Regarding: PHYSICAL THERAPY,HOME EXERCISE  Insurance is requesting the following:   Specific dates and duration of active conservative care (physical therapy, chiropractic, osteopathic manipulative treatment or home exercise program) showing 4 weeks worth within the past 6 months     Please let me know if Dr. OLEARY will send patient to therapy I can with draw the case and not let it deny

## 2024-04-11 NOTE — TELEPHONE ENCOUNTER
4/11/24 @ 1255 called and spoke with tyler at [facility] they are not in network for medicaid. Told to try [facility] will try them.  4/11/24 @ 1300 called and spoke with [facility] they are in network with [place] medicaid will send order.  4/11/24 @ 1305 called and spoke with pt. She is aware of location change

## 2024-04-12 ENCOUNTER — TELEPHONE (OUTPATIENT)
Dept: ORTHOPEDICS | Facility: CLINIC | Age: 59
End: 2024-04-12
Payer: MEDICAID

## 2024-04-12 NOTE — TELEPHONE ENCOUNTER
----- Message from Caryl Thrasher sent at 4/11/2024  4:43 PM CDT -----  Viktoria with [facility] calling to get referral that was faxed over re faxed -  - states the pack page does not have a dr's signature or date on it

## 2024-05-02 ENCOUNTER — TELEPHONE (OUTPATIENT)
Dept: ORTHOPEDICS | Facility: CLINIC | Age: 59
End: 2024-05-02
Payer: MEDICAID

## 2024-05-02 DIAGNOSIS — M79.671 RIGHT FOOT PAIN: Primary | ICD-10-CM

## 2024-05-02 NOTE — TELEPHONE ENCOUNTER
----- Message from Olamide Gutiérrez sent at 5/2/2024 10:29 AM CDT -----  Pt states PT is not helping at all. Call [phone].

## 2024-05-02 NOTE — TELEPHONE ENCOUNTER
5/2/2024 @1543  Pt states she isnt able to do PT which was needed in order for her to be approved for an MRI from insurance. PT hasn't sent us a progress note stating that she failed PT.pt wants to proceed with trying for an MRI anyways. MRI scheduled for May 28 @ 1.

## 2024-05-30 ENCOUNTER — TELEPHONE (OUTPATIENT)
Dept: ORTHOPEDICS | Facility: CLINIC | Age: 59
End: 2024-05-30
Payer: MEDICAID

## 2024-05-30 NOTE — TELEPHONE ENCOUNTER
----- Message from Yenny Judge sent at 5/30/2024  2:38 PM CDT -----  Who Called: Leti Landeros    Caller is requesting assistance/information from provider's office.    Symptoms (please be specific): Pt said she need you to fax her MRI result to Dr Asha Singh , his phone # [phone]    Preferred Method of Contact: Phone Call  Patient's Preferred Phone Number on File: [phone]   Best Call Back Number, if different:  Additional Information: Pt need to speak with you

## 2024-09-12 ENCOUNTER — OFFICE VISIT (OUTPATIENT)
Dept: DERMATOLOGY | Facility: CLINIC | Age: 59
End: 2024-09-12
Payer: MEDICAID

## 2024-09-12 DIAGNOSIS — R61 HYPERHIDROSIS: ICD-10-CM

## 2024-09-12 DIAGNOSIS — L73.2 HIDRADENITIS SUPPURATIVA: Primary | ICD-10-CM

## 2024-09-12 RX ORDER — ALUMINUM CHLORIDE 20 %
SOLUTION, NON-ORAL TOPICAL
Qty: 60 ML | Refills: 11 | Status: SHIPPED | OUTPATIENT
Start: 2024-09-12

## 2024-09-12 RX ORDER — CLINDAMYCIN PHOSPHATE 10 UG/ML
LOTION TOPICAL
Qty: 60 ML | Refills: 11 | Status: SHIPPED | OUTPATIENT
Start: 2024-09-12

## 2024-09-12 NOTE — PROGRESS NOTES
[facility]   Yanique Munguia MD    Patient Name: Leti Landeros  Patient YOB: 1965   Date of Service: 24    CC: Follow-up Hidradenitis Suppurativa and Hyperhidrosis     HPI: Leti Landeros is a 59 y.o. female here today for follow-up of HS, last seen .  Previous treatments include clindamycin lotion, and hibiclens.  Overall, the HS is stable.  Treatment plan was followed as directed.     Past Medical History:   Diagnosis Date    Diabetes mellitus     Fibromyalgia     Hypertension     Scleroderma     Sinusitis      Past Surgical History:   Procedure Laterality Date     SECTION      DILATION AND CURETTAGE OF UTERUS      TONSILLECTOMY, ADENOIDECTOMY       Review of patient's allergies indicates:   Allergen Reactions    Adhesive tape-silicones     Corticosteroids (glucocorticoids)     Lincocin [lincomycin]     Promethazine hcl     Sulfa (sulfonamide antibiotics)      Patient is also allergic to an unknown steroid drug    Methocarbamol Nausea And Vomiting       Current Outpatient Medications:     aluminum chloride (DRYSOL DAB-O-MATIC) 20 % external solution, Apply to underarms daily, Disp: 60 mL, Rfl: 11    atenoloL-chlorthalidone (TENORETIC) 50-25 mg Tab, Take 1 tablet by mouth once daily., Disp: , Rfl:     chlorhexidine (HIBICLENS) 4 % external liquid, Use as a daily wash to affected areas, Disp: 473 mL, Rfl: 11    clindamycin (CLEOCIN T) 1 % lotion, Apply thin layer to affected areas daily after shower, Disp: 60 mL, Rfl: 11    empagliflozin (JARDIANCE) 25 mg tablet, Take 25 mg by mouth once daily., Disp: , Rfl:     ergocalciferol (ERGOCALCIFEROL) 50,000 unit Cap, Take 1 capsule (50,000 Units total) by mouth every 7 days., Disp: 4 capsule, Rfl: 5    esomeprazole (NEXIUM) 40 MG capsule, Take 40 mg by mouth once daily., Disp: , Rfl:     HYDROcodone-acetaminophen (NORCO)  mg per tablet, , Disp: , Rfl:     meclizine (ANTIVERT) 25 mg tablet, Take 1 tablet (25 mg total)  by mouth 3 (three) times daily as needed for Dizziness., Disp: 30 tablet, Rfl: 2    MOUNJARO 15 mg/0.5 mL PnIj, Inject 15 mg into the skin once a week., Disp: , Rfl:     potassium chloride SA (K-DUR,KLOR-CON) 20 MEQ tablet, Take 1 tablet (20 mEq total) by mouth 2 (two) times daily., Disp: 6 tablet, Rfl: 0    potassium citrate (UROCIT-K) 10 mEq (1,080 mg) TbSR, Take 20 mEq by mouth., Disp: , Rfl:     tamsulosin (FLOMAX) 0.4 mg Cap, Take 0.4 mg by mouth., Disp: , Rfl:     ROS: A focused review of systems was obtained and negative.     Exam: A focused skin exam was performed. All areas examined were normal except as mentioned in the assessment and plan below.  General Appearance of the patient is well developed and well nourished.  Orientation: alert and oriented x 3.  Mood and affect: pleasant.    Assessment:   The primary encounter diagnosis was Hidradenitis suppurativa. A diagnosis of Hyperhidrosis was also pertinent to this visit.    Plan:   Medications Ordered This Encounter   Medications    aluminum chloride (DRYSOL DAB-O-MATIC) 20 % external solution     Sig: Apply to underarms daily     Dispense:  60 mL     Refill:  11    clindamycin (CLEOCIN T) 1 % lotion     Sig: Apply thin layer to affected areas daily after shower     Dispense:  60 mL     Refill:  11       Hidradenitis Suppurativa  - clear  Coelho Stage: I  Status: Stable    Plan: Counseling.  I counseled the patient regarding the following:  Skin care: Cleanse acneiform lesions and sinus tracts with anti-bacterial washes. Oral antibiotics can help reduce  inflammation.  Expectations: Hidradenitis Suppurativa is characterized by acneiform lesions and draining sinus tracts in the  axillary, abdominal or inguinal folds.  Contact office if: Patient develops worsening lesions or fistula formation despite treatment.     - Pt declines treatment for pilonidal cyst at this time, but states she will call the clinic for surgery referral if it becomes bothersome in  the future  - continue clinda for HS    Hyperhidrosis  - Excessive sweating  Status: Stable      Plan: Counseling.  I counseled the patient regarding the following:  Skin care: Hyperhidrosis can be treated with 20% aluminum chloride at bedtime, iontophoresis and botulinum  toxin. Severe cases can be treated with robinul.  Expectations: Hyperhidrosis is excessive sweating, and usually occurs in the axilla, palms or soles.  Contact office if: Hyperhidrosis persists despite ongoing therapy, or if patient develops a side effect from  Treatment.    - will refill Drysol      Follow up in about 1 year (around 9/12/2025) for FU.    Yanique Munguia MD

## 2024-12-02 ENCOUNTER — OFFICE VISIT (OUTPATIENT)
Dept: FAMILY MEDICINE | Facility: CLINIC | Age: 59
End: 2024-12-02
Payer: MEDICAID

## 2024-12-02 VITALS
OXYGEN SATURATION: 100 % | DIASTOLIC BLOOD PRESSURE: 88 MMHG | TEMPERATURE: 98 F | SYSTOLIC BLOOD PRESSURE: 146 MMHG | HEART RATE: 82 BPM | HEIGHT: 64 IN | WEIGHT: 143.19 LBS | BODY MASS INDEX: 24.45 KG/M2 | RESPIRATION RATE: 18 BRPM

## 2024-12-02 DIAGNOSIS — R07.9 CHEST PAIN, UNSPECIFIED TYPE: Primary | ICD-10-CM

## 2024-12-02 DIAGNOSIS — M79.671 RIGHT FOOT PAIN: ICD-10-CM

## 2024-12-02 DIAGNOSIS — M25.50 MULTIPLE JOINT PAIN: ICD-10-CM

## 2024-12-02 DIAGNOSIS — I10 ESSENTIAL HYPERTENSION: ICD-10-CM

## 2024-12-02 LAB
ERYTHROCYTE [SEDIMENTATION RATE] IN BLOOD BY WESTERGREN METHOD: 9 MM/HR (ref 0–30)
RHEUMATOID FACT SER NEPH-ACNC: NEGATIVE [IU]/ML
URATE SERPL-MCNC: 3 MG/DL (ref 2.6–6)

## 2024-12-02 PROCEDURE — 1159F MED LIST DOCD IN RCRD: CPT | Mod: CPTII,,, | Performed by: FAMILY MEDICINE

## 2024-12-02 PROCEDURE — 85651 RBC SED RATE NONAUTOMATED: CPT | Mod: ,,, | Performed by: CLINICAL MEDICAL LABORATORY

## 2024-12-02 PROCEDURE — 93000 ELECTROCARDIOGRAM COMPLETE: CPT | Mod: ,,, | Performed by: FAMILY MEDICINE

## 2024-12-02 PROCEDURE — 1160F RVW MEDS BY RX/DR IN RCRD: CPT | Mod: CPTII,,, | Performed by: FAMILY MEDICINE

## 2024-12-02 PROCEDURE — 86430 RHEUMATOID FACTOR TEST QUAL: CPT | Mod: ,,, | Performed by: CLINICAL MEDICAL LABORATORY

## 2024-12-02 PROCEDURE — 86038 ANTINUCLEAR ANTIBODIES: CPT | Mod: ,,, | Performed by: CLINICAL MEDICAL LABORATORY

## 2024-12-02 PROCEDURE — 84550 ASSAY OF BLOOD/URIC ACID: CPT | Mod: ,,, | Performed by: CLINICAL MEDICAL LABORATORY

## 2024-12-02 PROCEDURE — 3077F SYST BP >= 140 MM HG: CPT | Mod: CPTII,,, | Performed by: FAMILY MEDICINE

## 2024-12-02 PROCEDURE — 3079F DIAST BP 80-89 MM HG: CPT | Mod: CPTII,,, | Performed by: FAMILY MEDICINE

## 2024-12-02 PROCEDURE — 99214 OFFICE O/P EST MOD 30 MIN: CPT | Mod: 25,,, | Performed by: FAMILY MEDICINE

## 2024-12-02 PROCEDURE — 3008F BODY MASS INDEX DOCD: CPT | Mod: CPTII,,, | Performed by: FAMILY MEDICINE

## 2024-12-02 NOTE — PROGRESS NOTES
New Clinic Note    Leti Landeros is a 59 y.o. female     Chief Complaint   Patient presents with    Foot Pain     Patient has seen Dr. Sal for right foot pain. She had an MRI done on her foot and is currently in a walking boot. She wants to see if she can get a referral to [place] [facility]. Her foot is still swelling and hurts all the time. It hurts to walk on the foot.     Letter for School/Work     Patient needs a letter for work with her limitations for what  she can do and not do. Patient said she has schleroderma and fibromyalgia     Fatigue     Patient is having fatigue and just stays tired all the time.     Chest Pain     Patient complains of chest pain that started last night. She said it feels like someone is standing on her chest.         Subjective:     Patient complains of multiple joint pain. She says she was diagnosed with scleroderma several years ago. She no longer sees  rheumatology. She has not seen them in over 10 years. She says she takes Vit D to help with scleroderma. Patient complains of chronic fatigue. Patient complains of right foot pain. She has seen Dr. León for this in the past. She wants a second opinion. She complains of chest pain that started last night. She says it is constant. It does not come and go. She denies shortness of breath. Pain is worse with movement. She complains of dysphagia. She went to GI but she did not follow up for her EGD. She complains of belching. Patient has not been taking her blood pressure medication. She is taking a mixture she saw on You Tube which she says works to control her medication.     Past Medical History:   Diagnosis Date    Diabetes mellitus     Fibromyalgia     Hypertension     Scleroderma     Sinusitis       Family History   Problem Relation Name Age of Onset    Diabetes Mother      Cancer Father        Past Surgical History:   Procedure Laterality Date     SECTION      DILATION AND CURETTAGE OF UTERUS      TONSILLECTOMY,  "ADENOIDECTOMY            Review of Systems   Constitutional:  Positive for fatigue. Negative for fever.   HENT:  Negative for ear pain, postnasal drip, rhinorrhea and sinus pressure/congestion.    Respiratory:  Negative for cough and shortness of breath.    Cardiovascular:  Positive for chest pain.   Gastrointestinal:  Positive for abdominal pain and constipation. Negative for abdominal distention, diarrhea, nausea and vomiting.   Genitourinary:  Negative for dysuria.   Neurological:  Negative for headaches.        Objective  BP (!) 146/88 (BP Location: Left arm, Patient Position: Sitting)   Pulse 82   Temp 98.1 °F (36.7 °C) (Oral)   Resp 18   Ht 5' 4" (1.626 m)   Wt 65 kg (143 lb 3.2 oz)   LMP 03/15/2022 (Approximate) Comment: patient states she has irregular periods d/t menopause  SpO2 100%   BMI 24.58 kg/m²    Physical Exam  HENT:      Head: Normocephalic and atraumatic.   Cardiovascular:      Rate and Rhythm: Normal rate and regular rhythm.   Pulmonary:      Effort: Pulmonary effort is normal.      Breath sounds: Normal breath sounds.   Neurological:      Mental Status: She is alert and oriented to person, place, and time.   Psychiatric:         Mood and Affect: Mood normal.         Behavior: Behavior normal.          Assessment and Plan  1. Chest pain, unspecified type    2. Right foot pain    3. Multiple joint pain    4. Essential hypertension         Problem List Items Addressed This Visit       Right foot pain  Not controlled. Refer for second opinion.     Relevant Orders    Ambulatory referral/consult to Orthopedics      Essential hypertension  Not controlled. Recommend taking medications as prescribed. Follow up in 1 month with blood pressure log.      Other Visit Diagnoses       Chest pain, unspecified type    -  Primary  EKG - normal sinus rhythm. No ST elevation or depression  Chest pain does not sound like typical cardiac pain sound more musculoskeletal. However if this does not improve, go to " ER.     Relevant Orders    POCT EKG 12-LEAD (Manually Resulted by Ordering Provider)      Multiple joint pain      Will do an arthritis panel to check for autoimmune disorder. If abnormal, refer to rheumatology    Relevant Orders    Uric Acid (Completed)    Rheumatoid Factor Screen (Completed)    ARRON EIA w/ Reflex to dsDNA/EARL (Completed)    Sedimentation Rate (Completed)    Antiextractable Nuclear Ag (Completed)    Anti-DNA Ab, Double-Stranded (Completed)    Ab to Extractable Nuclear Ag MARISSA Fontaine             Follow up in about 4 weeks (around 12/30/2024).

## 2024-12-02 NOTE — LETTER
December 2, 2024      [facility]  [address] [place] [address]  Phone: [phone]  Fax: [phone]       Patient: Leti Landeros   YOB: 1965  Date of Visit: 12/02/2024    To Whom It May Concern:    Saida Landeros  was at [facility] on 12/02/2024. The patient may return to work/school on 12/03/2024 with the following restrictions. Patient has fibromyalgia and chronic foot pain. She is having to wear a walker boor now. She is unable to go up and down stairs. She is being referred to an orthopedic foot doctor. If you have any questions or concerns, or if I can be of further assistance, please do not hesitate to contact me.    Sincerely,    Abiola Werner LPN

## 2024-12-03 LAB
ANA SER QL: POSITIVE
DSDNA IGG SERPL IA-ACNC: 15 IU (ref 0–24)
DSDNA IGG SERPL IA-ACNC: NEGATIVE [IU]/ML
ENA AB SER QL IA: 28.8 EUS (ref 0–19.9)
ENA AB SER QL IA: POSITIVE

## 2024-12-04 DIAGNOSIS — R89.9 ABNORMAL LABORATORY TEST: Primary | ICD-10-CM

## 2024-12-04 PROBLEM — I10 ESSENTIAL HYPERTENSION: Status: ACTIVE | Noted: 2024-12-04

## 2024-12-16 DIAGNOSIS — R10.13 EPIGASTRIC PAIN: ICD-10-CM

## 2024-12-16 DIAGNOSIS — K21.9 GASTROESOPHAGEAL REFLUX DISEASE, UNSPECIFIED WHETHER ESOPHAGITIS PRESENT: Primary | ICD-10-CM

## 2025-01-06 ENCOUNTER — OFFICE VISIT (OUTPATIENT)
Dept: FAMILY MEDICINE | Facility: CLINIC | Age: 60
End: 2025-01-06
Payer: MEDICAID

## 2025-01-06 VITALS
DIASTOLIC BLOOD PRESSURE: 82 MMHG | RESPIRATION RATE: 16 BRPM | HEIGHT: 64 IN | TEMPERATURE: 98 F | WEIGHT: 154.63 LBS | SYSTOLIC BLOOD PRESSURE: 151 MMHG | OXYGEN SATURATION: 98 % | BODY MASS INDEX: 26.4 KG/M2

## 2025-01-06 DIAGNOSIS — E11.9 TYPE 2 DIABETES MELLITUS WITHOUT COMPLICATION, WITH LONG-TERM CURRENT USE OF INSULIN: Primary | ICD-10-CM

## 2025-01-06 DIAGNOSIS — Z79.4 TYPE 2 DIABETES MELLITUS WITHOUT COMPLICATION, WITH LONG-TERM CURRENT USE OF INSULIN: Primary | ICD-10-CM

## 2025-01-06 DIAGNOSIS — E53.8 B12 DEFICIENCY: ICD-10-CM

## 2025-01-06 DIAGNOSIS — G89.29 CHRONIC BACK PAIN, UNSPECIFIED BACK LOCATION, UNSPECIFIED BACK PAIN LATERALITY: ICD-10-CM

## 2025-01-06 DIAGNOSIS — I10 ESSENTIAL HYPERTENSION: ICD-10-CM

## 2025-01-06 DIAGNOSIS — M54.9 CHRONIC BACK PAIN, UNSPECIFIED BACK LOCATION, UNSPECIFIED BACK PAIN LATERALITY: ICD-10-CM

## 2025-01-06 DIAGNOSIS — L73.2 HIDRADENITIS SUPPURATIVA: ICD-10-CM

## 2025-01-06 LAB
CHOLEST SERPL-MCNC: 227 MG/DL
CHOLEST/HDLC SERPL: 2.9 {RATIO}
CREAT UR-MCNC: 28 MG/DL (ref 15–325)
EST. AVERAGE GLUCOSE BLD GHB EST-MCNC: 148 MG/DL
HBA1C MFR BLD HPLC: 6.8 %
HDLC SERPL-MCNC: 77 MG/DL (ref 35–60)
MICROALBUMIN UR-MCNC: <0.5 MG/DL
MICROALBUMIN/CREAT RATIO PNL UR: NORMAL
NONHDLC SERPL-MCNC: 150 MG/DL
TRIGL SERPL-MCNC: 424 MG/DL (ref 37–140)
VLDLC SERPL-MCNC: ABNORMAL MG/DL

## 2025-01-06 PROCEDURE — 3044F HG A1C LEVEL LT 7.0%: CPT | Mod: CPTII,,, | Performed by: FAMILY MEDICINE

## 2025-01-06 PROCEDURE — 3077F SYST BP >= 140 MM HG: CPT | Mod: CPTII,,, | Performed by: FAMILY MEDICINE

## 2025-01-06 PROCEDURE — 3079F DIAST BP 80-89 MM HG: CPT | Mod: CPTII,,, | Performed by: FAMILY MEDICINE

## 2025-01-06 PROCEDURE — 1159F MED LIST DOCD IN RCRD: CPT | Mod: CPTII,,, | Performed by: FAMILY MEDICINE

## 2025-01-06 PROCEDURE — 3008F BODY MASS INDEX DOCD: CPT | Mod: CPTII,,, | Performed by: FAMILY MEDICINE

## 2025-01-06 PROCEDURE — 83036 HEMOGLOBIN GLYCOSYLATED A1C: CPT | Mod: ,,, | Performed by: CLINICAL MEDICAL LABORATORY

## 2025-01-06 PROCEDURE — 99214 OFFICE O/P EST MOD 30 MIN: CPT | Mod: ,,, | Performed by: FAMILY MEDICINE

## 2025-01-06 PROCEDURE — 1160F RVW MEDS BY RX/DR IN RCRD: CPT | Mod: CPTII,,, | Performed by: FAMILY MEDICINE

## 2025-01-06 PROCEDURE — 82570 ASSAY OF URINE CREATININE: CPT | Mod: ,,, | Performed by: CLINICAL MEDICAL LABORATORY

## 2025-01-06 PROCEDURE — 82043 UR ALBUMIN QUANTITATIVE: CPT | Mod: ,,, | Performed by: CLINICAL MEDICAL LABORATORY

## 2025-01-06 PROCEDURE — 80061 LIPID PANEL: CPT | Mod: ,,, | Performed by: CLINICAL MEDICAL LABORATORY

## 2025-01-06 RX ORDER — ERGOCALCIFEROL 1.25 MG/1
50000 CAPSULE ORAL
Qty: 4 CAPSULE | Refills: 5 | Status: SHIPPED | OUTPATIENT
Start: 2025-01-06

## 2025-01-06 RX ORDER — ASPIRIN 81 MG/1
81 TABLET ORAL DAILY
COMMUNITY
Start: 2024-12-03

## 2025-01-06 RX ORDER — TIRZEPATIDE 15 MG/.5ML
15 INJECTION, SOLUTION SUBCUTANEOUS WEEKLY
Qty: 4 PEN | Refills: 5 | Status: SHIPPED | OUTPATIENT
Start: 2025-01-06

## 2025-01-06 RX ORDER — ALUMINUM CHLORIDE 20 %
SOLUTION, NON-ORAL TOPICAL
Qty: 60 ML | Refills: 11 | Status: SHIPPED | OUTPATIENT
Start: 2025-01-06

## 2025-01-06 NOTE — PROGRESS NOTES
New Clinic Note    Leti Landeros is a 59 y.o. female     CC:   Chief Complaint   Patient presents with    Diabetes     Patient is no longer working in [place]. She was seeing a provider there for her diabetes ad hypertension. She wants to let Dr. Gaviria start writing her refills.     Hypertension     Patient was on BP meds and BP was dropping too low. They told her she could start back if her BP started going up. Patient states she started back on  the Tenoretic this morning. She is also having a lot of pain in her foot and is sure this is why her BP is elevated.         Subjective    History of Present Illness HPI   Patient is here for evaluation of  her chronic medical problems. She has been seen by a physician in [place] for this. She wants to get her medication filled here now. She has been out of her Mounro.     Current Outpatient Medications:     aspirin (ECOTRIN) 81 MG EC tablet, Take 81 mg by mouth once daily., Disp: , Rfl:     atenoloL-chlorthalidone (TENORETIC) 50-25 mg Tab, Take 1 tablet by mouth once daily., Disp: , Rfl:     chlorhexidine (HIBICLENS) 4 % external liquid, Use as a daily wash to affected areas, Disp: 473 mL, Rfl: 11    meclizine (ANTIVERT) 25 mg tablet, Take 1 tablet (25 mg total) by mouth 3 (three) times daily as needed for Dizziness., Disp: 30 tablet, Rfl: 2    multivitamin with minerals tablet, Take 1 tablet by mouth once daily., Disp: , Rfl:     potassium citrate (UROCIT-K) 10 mEq (1,080 mg) TbSR, Take 20 mEq by mouth., Disp: , Rfl:     tamsulosin (FLOMAX) 0.4 mg Cap, Take 0.4 mg by mouth., Disp: , Rfl:     aluminum chloride (DRYSOL DAB-O-MATIC) 20 % external solution, Apply to underarms daily, Disp: 60 mL, Rfl: 11    atorvastatin (LIPITOR) 10 MG tablet, Take 1 tablet (10 mg total) by mouth once daily., Disp: 90 tablet, Rfl: 3    clindamycin (CLEOCIN T) 1 % lotion, Apply thin layer to affected areas daily after shower (Patient not taking: Reported on 1/6/2025), Disp: 60 mL, Rfl:  "11    empagliflozin (JARDIANCE) 25 mg tablet, Take 1 tablet (25 mg total) by mouth once daily., Disp: 30 tablet, Rfl: 5    ergocalciferol (ERGOCALCIFEROL) 50,000 unit Cap, Take 1 capsule (50,000 Units total) by mouth every 7 days., Disp: 4 capsule, Rfl: 5    HYDROcodone-acetaminophen (NORCO)  mg per tablet, , Disp: , Rfl:     MOUNJARO 15 mg/0.5 mL PnIj, Inject 15 mg into the skin once a week., Disp: 4 Pen, Rfl: 5    potassium chloride SA (K-DUR,KLOR-CON) 20 MEQ tablet, Take 1 tablet (20 mEq total) by mouth 2 (two) times daily. (Patient not taking: Reported on 2025), Disp: 6 tablet, Rfl: 0     Past Medical History:   Diagnosis Date    Carpal tunnel syndrome on both sides     Diabetes mellitus     Fibromyalgia     Hypertension     Scleroderma     Sinusitis         Family History   Problem Relation Name Age of Onset    Diabetes Mother      Cancer Father          Past Surgical History:   Procedure Laterality Date     SECTION      DILATION AND CURETTAGE OF UTERUS      TONSILLECTOMY, ADENOIDECTOMY          Review of Systems   Constitutional:  Negative for fatigue and fever.   HENT:  Negative for ear pain, postnasal drip, rhinorrhea and sinus pressure/congestion.    Respiratory:  Negative for cough and shortness of breath.    Cardiovascular:  Negative for chest pain.   Gastrointestinal:  Negative for abdominal pain, diarrhea, nausea and vomiting.   Genitourinary:  Negative for dysuria.   Neurological:  Negative for headaches.        BP (!) 151/82 (BP Location: Left arm, Patient Position: Sitting)   Temp 98.1 °F (36.7 °C) (Oral)   Resp 16   Ht 5' 4" (1.626 m)   Wt 70.1 kg (154 lb 9.6 oz)   LMP 03/15/2022 (Approximate) Comment: patient states she has irregular periods d/t menopause  SpO2 98%   BMI 26.54 kg/m²      Physical Exam  HENT:      Head: Normocephalic and atraumatic.      Mouth/Throat:      Pharynx: Oropharynx is clear.   Cardiovascular:      Rate and Rhythm: Normal rate and regular rhythm. "   Pulmonary:      Effort: Pulmonary effort is normal.      Breath sounds: Normal breath sounds.   Neurological:      Mental Status: She is alert and oriented to person, place, and time.   Psychiatric:         Mood and Affect: Mood normal.         Behavior: Behavior normal.          Assessment and Plan      ICD-10-CM ICD-9-CM   1. Type 2 diabetes mellitus without complication, with long-term current use of insulin  E11.9 250.00    Z79.4 V58.67   2. B12 deficiency  E53.8 266.2   3. Hidradenitis suppurativa  L73.2 705.83   4. Essential hypertension  I10 401.9   5. Chronic back pain, unspecified back location, unspecified back pain laterality  M54.9 724.5    G89.29 338.29        1. Type 2 diabetes mellitus without complication, with long-term current use of insulin  The current medical regimen is effective;  continue present plan and medications.  -     MOUNJARO 15 mg/0.5 mL PnIj; Inject 15 mg into the skin once a week.  Dispense: 4 Pen; Refill: 5  -     empagliflozin (JARDIANCE) 25 mg tablet; Take 1 tablet (25 mg total) by mouth once daily.  Dispense: 30 tablet; Refill: 5  -     Hemoglobin A1C; Future; Expected date: 01/06/2025  -     Microalbumin/Creatinine Ratio, Urine    2. Vit D deficiency  The current medical regimen is effective;  continue present plan and medications.  -     ergocalciferol (ERGOCALCIFEROL) 50,000 unit Cap; Take 1 capsule (50,000 Units total) by mouth every 7 days.  Dispense: 4 capsule; Refill: 5    3. Hidradenitis suppurativa  -     aluminum chloride (DRYSOL DAB-O-MATIC) 20 % external solution; Apply to underarms daily  Dispense: 60 mL; Refill: 11    4. Essential hypertension  Not controlled today but patient has not been taking her medications. Take meds as prescribed. Follow up in 1 month.   -     Lipid Panel; Future; Expected date: 01/06/2025    5. Chronic back pain, unspecified back location, unspecified back pain laterality  Patient has been receiving hydrocodone from her previous  physician. Explained that I would send her to pain treatment. She said she would think about this. She will let us know if she wants to be referred.          Follow up in about 4 weeks (around 2/3/2025).

## 2025-01-06 NOTE — LETTER
January 6, 2025      [facility] [address] [place] [address]  Phone: [phone]  Fax: [phone]       Patient: Leti Landeros   YOB: 1965  Date of Visit: 01/06/2025    To Whom It May Concern:    Saida Landeros  was at [facility] on 01/06/2025. The patient may return to work/school on 01/07/2025 with no restrictions. If you have any questions or concerns, or if I can be of further assistance, please do not hesitate to contact me.    Sincerely,    Junie Tracy RN

## 2025-01-10 RX ORDER — ATORVASTATIN CALCIUM 10 MG/1
10 TABLET, FILM COATED ORAL DAILY
Qty: 90 TABLET | Refills: 3 | Status: SHIPPED | OUTPATIENT
Start: 2025-01-10 | End: 2026-01-10

## 2025-01-10 NOTE — PROGRESS NOTES
Cholesterol is very high. Has she ever been on cholesterol medication before? Other lab within acceptable limits.

## 2025-01-10 NOTE — TELEPHONE ENCOUNTER
----- Message from Emmie Gaviria MD sent at 1/10/2025  4:54 PM CST -----  Atorvastatin 10mg poqd. Repeat in 3 months.

## 2025-01-12 PROBLEM — L73.2 HIDRADENITIS SUPPURATIVA: Status: ACTIVE | Noted: 2025-01-12

## 2025-01-12 PROBLEM — M54.9 CHRONIC BACK PAIN: Status: ACTIVE | Noted: 2025-01-12

## 2025-01-12 PROBLEM — G89.29 CHRONIC BACK PAIN: Status: ACTIVE | Noted: 2025-01-12

## 2025-01-22 ENCOUNTER — OFFICE VISIT (OUTPATIENT)
Dept: FAMILY MEDICINE | Facility: CLINIC | Age: 60
End: 2025-01-22
Payer: MEDICAID

## 2025-01-22 VITALS
HEART RATE: 79 BPM | BODY MASS INDEX: 26.15 KG/M2 | RESPIRATION RATE: 18 BRPM | WEIGHT: 153.19 LBS | DIASTOLIC BLOOD PRESSURE: 79 MMHG | SYSTOLIC BLOOD PRESSURE: 132 MMHG | OXYGEN SATURATION: 100 % | TEMPERATURE: 98 F | HEIGHT: 64 IN

## 2025-01-22 DIAGNOSIS — R42 VERTIGO: ICD-10-CM

## 2025-01-22 DIAGNOSIS — G56.03 BILATERAL CARPAL TUNNEL SYNDROME: ICD-10-CM

## 2025-01-22 DIAGNOSIS — H65.23 BILATERAL CHRONIC SEROUS OTITIS MEDIA: Primary | ICD-10-CM

## 2025-01-22 PROCEDURE — 99213 OFFICE O/P EST LOW 20 MIN: CPT | Mod: ,,, | Performed by: FAMILY MEDICINE

## 2025-01-22 PROCEDURE — 3078F DIAST BP <80 MM HG: CPT | Mod: CPTII,,, | Performed by: FAMILY MEDICINE

## 2025-01-22 PROCEDURE — 1159F MED LIST DOCD IN RCRD: CPT | Mod: CPTII,,, | Performed by: FAMILY MEDICINE

## 2025-01-22 PROCEDURE — 1160F RVW MEDS BY RX/DR IN RCRD: CPT | Mod: CPTII,,, | Performed by: FAMILY MEDICINE

## 2025-01-22 PROCEDURE — 3008F BODY MASS INDEX DOCD: CPT | Mod: CPTII,,, | Performed by: FAMILY MEDICINE

## 2025-01-22 PROCEDURE — 3075F SYST BP GE 130 - 139MM HG: CPT | Mod: CPTII,,, | Performed by: FAMILY MEDICINE

## 2025-01-22 PROCEDURE — 3044F HG A1C LEVEL LT 7.0%: CPT | Mod: CPTII,,, | Performed by: FAMILY MEDICINE

## 2025-01-22 RX ORDER — CHLORPHENIRAMINE MALEATE AND PHENYLEPHRINE HYDROCHLORIDE 4; 10 MG/1; MG/1
1 TABLET, COATED ORAL EVERY 8 HOURS PRN
Qty: 30 TABLET | Refills: 0 | Status: SHIPPED | OUTPATIENT
Start: 2025-01-22 | End: 2025-02-01

## 2025-01-22 NOTE — PROGRESS NOTES
"New Clinic Note    Leti Landeros is a 59 y.o. female     CC:   Chief Complaint   Patient presents with    Dizziness     Patient has been having vertigo for several weeks. She went to Urgent Care and they told her to get meclizine OTC. She said thishas not helped. She said the vertigo seems to be getting worse.     Numbness     Patient has also been having a lot of numbness and pain in her hands. She said she will be holding something and will just  drop it because she can not feel it.         Subjective    History of Present Illness   History of Present Illness    CHIEF COMPLAINT:  Patient presents today for dizziness.    VERTIGO AND BALANCE:  She reports worsening dizziness over the past week, describing constant sensation of "going round and round" affecting her balance. She was treated at urgent care for vertigo and prescribed Meclizine 25mg, which has not improved symptoms and only causes drowsiness. She reports persistent ear pain and is unsure if there is fluid in her ears.    NEUROLOGICAL SYMPTOMS:  She reports numbness in her hands with loss of sensation, causing her to drop objects unexpectedly. She describes an incident where she lost  of a hot comb while styling her daughter's hair, and expresses concern about dropping her phone while in bed.    PAST MEDICAL HISTORY:  She has a history of sinus problems, currently asymptomatic.    PREVENTIVE CARE:  She declines flu vaccination.      ROS:  General: -fever, -chills, -fatigue, -weight gain, -weight loss  Eyes: -vision changes, -redness, -discharge  ENT: +ear pain, -nasal congestion, -sore throat  Cardiovascular: -chest pain, -palpitations, -lower extremity edema  Respiratory: -cough, -shortness of breath  Gastrointestinal: -abdominal pain, -nausea, -vomiting, -diarrhea, -constipation, -blood in stool  Genitourinary: -dysuria, -hematuria, -frequency  Musculoskeletal: -joint pain, -muscle pain  Skin: -rash, -lesion  Neurological: -headache, " +dizziness, +numbness, -tingling  Psychiatric: -anxiety, -depression, -sleep difficulty            Current Outpatient Medications:     aluminum chloride (DRYSOL DAB-O-MATIC) 20 % external solution, Apply to underarms daily, Disp: 60 mL, Rfl: 11    aspirin (ECOTRIN) 81 MG EC tablet, Take 81 mg by mouth once daily., Disp: , Rfl:     atenoloL-chlorthalidone (TENORETIC) 50-25 mg Tab, Take 1 tablet by mouth once daily., Disp: , Rfl:     atorvastatin (LIPITOR) 10 MG tablet, Take 1 tablet (10 mg total) by mouth once daily., Disp: 90 tablet, Rfl: 3    chlorhexidine (HIBICLENS) 4 % external liquid, Use as a daily wash to affected areas, Disp: 473 mL, Rfl: 11    chlorpheniramine-phenylephrine (ED A-HIST) 4-10 mg per tablet, Take 1 tablet by mouth every 8 (eight) hours as needed., Disp: 30 tablet, Rfl: 0    clindamycin (CLEOCIN T) 1 % lotion, Apply thin layer to affected areas daily after shower (Patient not taking: Reported on 1/6/2025), Disp: 60 mL, Rfl: 11    empagliflozin (JARDIANCE) 25 mg tablet, Take 1 tablet (25 mg total) by mouth once daily., Disp: 30 tablet, Rfl: 5    ergocalciferol (ERGOCALCIFEROL) 50,000 unit Cap, Take 1 capsule (50,000 Units total) by mouth every 7 days., Disp: 4 capsule, Rfl: 5    HYDROcodone-acetaminophen (NORCO)  mg per tablet, , Disp: , Rfl:     meclizine (ANTIVERT) 25 mg tablet, Take 1 tablet (25 mg total) by mouth 3 (three) times daily as needed for Dizziness., Disp: 30 tablet, Rfl: 2    MOUNJARO 15 mg/0.5 mL PnIj, Inject 15 mg into the skin once a week., Disp: 4 Pen, Rfl: 5    multivitamin with minerals tablet, Take 1 tablet by mouth once daily., Disp: , Rfl:     potassium chloride SA (K-DUR,KLOR-CON) 20 MEQ tablet, Take 1 tablet (20 mEq total) by mouth 2 (two) times daily. (Patient not taking: Reported on 1/6/2025), Disp: 6 tablet, Rfl: 0    potassium citrate (UROCIT-K) 10 mEq (1,080 mg) TbSR, Take 20 mEq by mouth., Disp: , Rfl:     tamsulosin (FLOMAX) 0.4 mg Cap, Take 0.4 mg by  "mouth., Disp: , Rfl:      Past Medical History:   Diagnosis Date    Carpal tunnel syndrome on both sides     Diabetes mellitus     Fibromyalgia     Hypertension     Scleroderma     Sinusitis         Family History   Problem Relation Name Age of Onset    Diabetes Mother      Cancer Father          Past Surgical History:   Procedure Laterality Date     SECTION      DILATION AND CURETTAGE OF UTERUS      TONSILLECTOMY, ADENOIDECTOMY          /79 (BP Location: Left arm, Patient Position: Sitting)   Pulse 79   Temp 98.2 °F (36.8 °C) (Oral)   Resp 18   Ht 5' 4" (1.626 m)   Wt 69.5 kg (153 lb 3.2 oz)   LMP 03/15/2022 (Approximate) Comment: patient states she has irregular periods d/t menopause  SpO2 100%   BMI 26.30 kg/m²      Physical Exam  HENT:      Head: Normocephalic and atraumatic.   Cardiovascular:      Rate and Rhythm: Normal rate and regular rhythm.   Pulmonary:      Effort: Pulmonary effort is normal.      Breath sounds: Normal breath sounds.   Neurological:      Mental Status: She is alert and oriented to person, place, and time.   Psychiatric:         Mood and Affect: Mood normal.         Behavior: Behavior normal.          Assessment and Plan    Assessment & Plan    IMPRESSION:  - Assessed patient's worsening dizziness and vertigo symptoms  - Evaluated ineffectiveness of previously prescribed Meclizine  - Examined ears, noting presence of fluid potentially contributing to vertigo symptoms  - Considered multiple treatment options for vertigo  - Will prescribe EdaHist to address fluid in ears      VERTIGO:  - Continued Meclizine 25 mg 3 times daily, noting its ineffectiveness.  - Referred the patient to an ENT specialist in [place] for further evaluation of vertigo symptoms.  - Patient reports constant dizziness and a sensation of everything spinning, affecting balance.  - Symptoms have been ongoing but worsened in the last week.  - Discussed possible causes of vertigo, including fluid in " the ears and displaced hairs in the ear canal.  - Instructed the patient to report any changes in symptoms or side effects from medications.    EAR ISSUES:  - Examined the patient's ears and found minimal fluid, which may be contributing to the vertigo.  - Prescribed Edecrin to help with fluid in the ears.  - Patient reports bilateral ear pain that has been ongoing for an extended period.  - Examined both of the patient's ears.  - Referred the patient to an ENT specialist for further evaluation and treatment of ear pain.    HAND NUMBNESS:  - Patient reports episodes of losing sensation in hands, causing objects to fall. Patient has history of carpal tunnel syndrome.   - Discussed potential referral to orthopedics for possible surgery, but noted the patient's skepticism about surgical outcomes based on previous medical advice and family experiences.      RHEUMATOLOGY REFERRAL:  - Instructed office staff (Abiola) to check on the status of rheumatology appointment.      FOLLOW UP:  - Advised the patient to follow up if symptoms persist or worsen.  - Follow up if symptoms worsen or persist.  - Offered work/school excuse note for today and tomorrow due to patient's condition.          Problem List Items Addressed This Visit       Bilateral carpal tunnel syndrome     Other Visit Diagnoses       Bilateral chronic serous otitis media    -  Primary    Relevant Medications    chlorpheniramine-phenylephrine (ED A-HIST) 4-10 mg per tablet    Other Relevant Orders    Ambulatory referral/consult to ENT    Vertigo        Relevant Orders    Ambulatory referral/consult to ENT             Orders Placed This Encounter   Procedures    Ambulatory referral/consult to ENT     Standing Status:   Future     Standing Expiration Date:   2/22/2026     Referral Priority:   Routine     Referral Type:   Consultation     Referral Reason:   Specialty Services Required     Requested Specialty:   Otolaryngology     Number of Visits Requested:   1           Follow up if symptoms worsen or fail to improve.     This note was generated with the assistance of ambient listening technology. Verbal consent was obtained by the patient and accompanying visitor(s) for the recording of patient appointment to facilitate this note. I attest to having reviewed and edited the generated note for accuracy, though some syntax or spelling errors may persist. Please contact the author of this note for any clarification.

## 2025-01-22 NOTE — LETTER
January 22, 2025      [facility] [address] [place] [address]  Phone: [phone]  Fax: [phone]       Patient: Leti Landeros   YOB: 1965  Date of Visit: 01/22/2025    To Whom It May Concern:    Saida Landeros  was at [facility] on 01/22/2025. The patient may return to work/school on 01/27/2025 with no restrictions. If you have any questions or concerns, or if I can be of further assistance, please do not hesitate to contact me.    Sincerely,    Abiola Werner LPN

## 2025-01-23 DIAGNOSIS — R89.9 ABNORMAL LABORATORY TEST: Primary | ICD-10-CM

## 2025-02-20 ENCOUNTER — HOSPITAL ENCOUNTER (OUTPATIENT)
Dept: RADIOLOGY | Facility: HOSPITAL | Age: 60
Discharge: HOME | End: 2025-02-20
Attending: FAMILY MEDICINE
Payer: MEDICAID

## 2025-02-20 ENCOUNTER — OFFICE VISIT (OUTPATIENT)
Dept: FAMILY MEDICINE | Facility: CLINIC | Age: 60
End: 2025-02-20
Payer: MEDICAID

## 2025-02-20 VITALS
BODY MASS INDEX: 26.15 KG/M2 | TEMPERATURE: 98 F | RESPIRATION RATE: 16 BRPM | DIASTOLIC BLOOD PRESSURE: 71 MMHG | SYSTOLIC BLOOD PRESSURE: 132 MMHG | HEART RATE: 73 BPM | WEIGHT: 153.19 LBS | OXYGEN SATURATION: 99 % | HEIGHT: 64 IN

## 2025-02-20 DIAGNOSIS — M79.671 RIGHT FOOT PAIN: ICD-10-CM

## 2025-02-20 DIAGNOSIS — M79.671 RIGHT FOOT PAIN: Primary | ICD-10-CM

## 2025-02-20 DIAGNOSIS — K21.9 GASTROESOPHAGEAL REFLUX DISEASE, UNSPECIFIED WHETHER ESOPHAGITIS PRESENT: ICD-10-CM

## 2025-02-20 PROCEDURE — 73630 X-RAY EXAM OF FOOT: CPT | Mod: TC,PN,RT

## 2025-02-20 RX ORDER — PANTOPRAZOLE SODIUM 40 MG/1
40 TABLET, DELAYED RELEASE ORAL DAILY
COMMUNITY
End: 2025-02-20 | Stop reason: SDUPTHER

## 2025-02-20 RX ORDER — PANTOPRAZOLE SODIUM 40 MG/1
40 TABLET, DELAYED RELEASE ORAL DAILY
Qty: 30 TABLET | Refills: 5 | Status: SHIPPED | OUTPATIENT
Start: 2025-02-20

## 2025-02-20 RX ORDER — KETOROLAC TROMETHAMINE 30 MG/ML
60 INJECTION, SOLUTION INTRAMUSCULAR; INTRAVENOUS
Status: COMPLETED | OUTPATIENT
Start: 2025-02-20 | End: 2025-02-20

## 2025-02-20 RX ORDER — DICLOFENAC SODIUM 75 MG/1
75 TABLET, DELAYED RELEASE ORAL 2 TIMES DAILY
Qty: 60 TABLET | Refills: 0 | Status: SHIPPED | OUTPATIENT
Start: 2025-02-20

## 2025-02-20 RX ORDER — CYCLOBENZAPRINE HCL 10 MG
10 TABLET ORAL 3 TIMES DAILY PRN
Qty: 30 TABLET | Refills: 1 | Status: SHIPPED | OUTPATIENT
Start: 2025-02-20 | End: 2025-03-12

## 2025-02-20 RX ADMIN — KETOROLAC TROMETHAMINE 60 MG: 30 INJECTION, SOLUTION INTRAMUSCULAR; INTRAVENOUS at 11:02

## 2025-02-20 NOTE — LETTER
February 20, 2025      [facility] [address] [place] [address]  Phone: [phone]  Fax: [phone]       Patient: Leti Landeros   YOB: 1965  Date of Visit: 02/20/2025    To Whom It May Concern:    Saida Landeros  was at [facility] on 02/20/2025. The patient may return to work/school on 02/24/2025 with no restrictions. If you have any questions or concerns, or if I can be of further assistance, please do not hesitate to contact me.    Sincerely,    Abiola Werner LPN

## 2025-02-20 NOTE — PROGRESS NOTES
New Clinic Note    Leti Landeros is a 59 y.o. female     CC:   Chief Complaint   Patient presents with    Hip Pain     Patient is complaining of right hip pain. She has an old right foot injury. She states a very large woman stepped on that foot on 2025. After that her foot hurt real bad and now she is having pain go up her leg and into her hip. She said it  is like her hips do not want to move. Patient said it feels like her whole right side is hurting. Even up into her arm.     Leg Pain    Foot Pain    Gastroesophageal Reflux     Patient also needs a refill on Protonix.         Subjective    History of Present Illness HPI   Patient complains of chronic right foot pain. She reports that a large woman stepped on this foot on 25 at work. She says this increased her foot pain. Now the pain is radiating into her hip. She says it is making her entire right side hurt including her arm. She missed her rheumatology appointment on Monday. She has been rescheduled until next month. She needs refills.     Current Medications[1]     Past Medical History:   Diagnosis Date    Carpal tunnel syndrome on both sides     Diabetes mellitus     Fibromyalgia     Hypertension     Scleroderma     Sinusitis         Family History   Problem Relation Name Age of Onset    Diabetes Mother      Cancer Father          Past Surgical History:   Procedure Laterality Date     SECTION      DILATION AND CURETTAGE OF UTERUS      TONSILLECTOMY, ADENOIDECTOMY          Review of Systems   Constitutional:  Negative for fatigue and fever.   HENT:  Negative for ear pain, postnasal drip, rhinorrhea and sinus pressure/congestion.    Respiratory:  Negative for cough and shortness of breath.    Cardiovascular:  Negative for chest pain.   Gastrointestinal:  Negative for abdominal pain, diarrhea, nausea and vomiting.   Genitourinary:  Negative for dysuria.   Neurological:  Negative for headaches.        /71 (BP Location: Left arm,  "Patient Position: Sitting)   Pulse 73   Temp 98 °F (36.7 °C) (Oral)   Resp 16   Ht 5' 4" (1.626 m)   Wt 69.5 kg (153 lb 3.2 oz)   LMP 03/15/2022 (Approximate) Comment: patient states she has irregular periods d/t menopause  SpO2 99%   BMI 26.30 kg/m²      Physical Exam  HENT:      Head: Normocephalic and atraumatic.   Cardiovascular:      Rate and Rhythm: Normal rate and regular rhythm.   Pulmonary:      Effort: Pulmonary effort is normal.      Breath sounds: Normal breath sounds.   Neurological:      Mental Status: She is alert and oriented to person, place, and time.      Gait: Gait abnormal.      Comments: Walks with a limp   Psychiatric:         Mood and Affect: Mood normal.         Behavior: Behavior normal.          Assessment and Plan      ICD-10-CM ICD-9-CM   1. Right foot pain  M79.671 729.5   2. Gastroesophageal reflux disease, unspecified whether esophagitis present  K21.9 530.81        1. Right foot pain  Not controlled. No fractures noted on X-ray. Told patient that I suspected that the other pain on the right side of her body was probably due to compensation of her gait with her foot pain. Will treat with flexeril and diclofenac. Stressed to patient that she needed to see rheumatology. It appears that she may have an autoimmune disease. This could also be contributing to her pain.   -     X-Ray Foot Complete 3 view Right; Future; Expected date: 02/20/2025  -     cyclobenzaprine (FLEXERIL) 10 MG tablet; Take 1 tablet (10 mg total) by mouth 3 (three) times daily as needed for Muscle spasms.  Dispense: 30 tablet; Refill: 1  -     ketorolac injection 60 mg  -     diclofenac (VOLTAREN) 75 MG EC tablet; Take 1 tablet (75 mg total) by mouth 2 (two) times daily.  Dispense: 60 tablet; Refill: 0    2. Gastroesophageal reflux disease, unspecified whether esophagitis present  The current medical regimen is effective;  continue present plan and medications.  -     pantoprazole (PROTONIX) 40 MG tablet; Take " 1 tablet (40 mg total) by mouth once daily.  Dispense: 30 tablet; Refill: 5         There are no Patient Instructions on file for this visit.     Follow up if symptoms worsen or fail to improve.            [1]   Current Outpatient Medications:     aluminum chloride (DRYSOL DAB-O-MATIC) 20 % external solution, Apply to underarms daily, Disp: 60 mL, Rfl: 11    aspirin (ECOTRIN) 81 MG EC tablet, Take 81 mg by mouth once daily., Disp: , Rfl:     atenoloL-chlorthalidone (TENORETIC) 50-25 mg Tab, Take 1 tablet by mouth once daily., Disp: , Rfl:     atorvastatin (LIPITOR) 10 MG tablet, Take 1 tablet (10 mg total) by mouth once daily., Disp: 90 tablet, Rfl: 3    chlorhexidine (HIBICLENS) 4 % external liquid, Use as a daily wash to affected areas, Disp: 473 mL, Rfl: 11    empagliflozin (JARDIANCE) 25 mg tablet, Take 1 tablet (25 mg total) by mouth once daily., Disp: 30 tablet, Rfl: 5    ergocalciferol (ERGOCALCIFEROL) 50,000 unit Cap, Take 1 capsule (50,000 Units total) by mouth every 7 days., Disp: 4 capsule, Rfl: 5    HYDROcodone-acetaminophen (NORCO)  mg per tablet, , Disp: , Rfl:     meclizine (ANTIVERT) 25 mg tablet, Take 1 tablet (25 mg total) by mouth 3 (three) times daily as needed for Dizziness., Disp: 30 tablet, Rfl: 2    MOUNJARO 15 mg/0.5 mL PnIj, Inject 15 mg into the skin once a week., Disp: 4 Pen, Rfl: 5    multivitamin with minerals tablet, Take 1 tablet by mouth once daily., Disp: , Rfl:     potassium citrate (UROCIT-K) 10 mEq (1,080 mg) TbSR, Take 20 mEq by mouth., Disp: , Rfl:     tamsulosin (FLOMAX) 0.4 mg Cap, Take 0.4 mg by mouth., Disp: , Rfl:     cyclobenzaprine (FLEXERIL) 10 MG tablet, Take 1 tablet (10 mg total) by mouth 3 (three) times daily as needed for Muscle spasms., Disp: 30 tablet, Rfl: 1    diclofenac (VOLTAREN) 75 MG EC tablet, Take 1 tablet (75 mg total) by mouth 2 (two) times daily., Disp: 60 tablet, Rfl: 0    pantoprazole (PROTONIX) 40 MG tablet, Take 1 tablet (40 mg total) by  mouth once daily., Disp: 30 tablet, Rfl: 5    potassium chloride SA (K-DUR,KLOR-CON) 20 MEQ tablet, Take 1 tablet (20 mEq total) by mouth 2 (two) times daily. (Patient not taking: Reported on 2/20/2025), Disp: 6 tablet, Rfl: 0  No current facility-administered medications for this visit.

## 2025-02-26 ENCOUNTER — ANESTHESIA EVENT (OUTPATIENT)
Dept: GASTROENTEROLOGY | Facility: HOSPITAL | Age: 60
End: 2025-02-26
Payer: MEDICAID

## 2025-02-26 ENCOUNTER — ANESTHESIA (OUTPATIENT)
Dept: GASTROENTEROLOGY | Facility: HOSPITAL | Age: 60
End: 2025-02-26
Payer: MEDICAID

## 2025-02-26 ENCOUNTER — HOSPITAL ENCOUNTER (OUTPATIENT)
Dept: GASTROENTEROLOGY | Facility: HOSPITAL | Age: 60
Discharge: HOME | End: 2025-02-26
Admitting: INTERNAL MEDICINE
Payer: MEDICAID

## 2025-02-26 VITALS
DIASTOLIC BLOOD PRESSURE: 65 MMHG | SYSTOLIC BLOOD PRESSURE: 123 MMHG | TEMPERATURE: 97 F | HEIGHT: 64 IN | BODY MASS INDEX: 26.12 KG/M2 | RESPIRATION RATE: 26 BRPM | WEIGHT: 153 LBS | HEART RATE: 76 BPM | OXYGEN SATURATION: 100 %

## 2025-02-26 DIAGNOSIS — R10.13 EPIGASTRIC PAIN: ICD-10-CM

## 2025-02-26 DIAGNOSIS — K59.04 CHRONIC IDIOPATHIC CONSTIPATION: Primary | Chronic | ICD-10-CM

## 2025-02-26 DIAGNOSIS — K21.9 GASTROESOPHAGEAL REFLUX DISEASE, UNSPECIFIED WHETHER ESOPHAGITIS PRESENT: ICD-10-CM

## 2025-02-26 LAB — GLUCOSE SERPL-MCNC: 126 MG/DL (ref 70–105)

## 2025-02-26 PROCEDURE — 88342 IMHCHEM/IMCYTCHM 1ST ANTB: CPT | Mod: TC,SUR | Performed by: INTERNAL MEDICINE

## 2025-02-26 PROCEDURE — 37000008 HC ANESTHESIA 1ST 15 MINUTES

## 2025-02-26 PROCEDURE — 63600175 PHARM REV CODE 636 W HCPCS

## 2025-02-26 PROCEDURE — 37000009 HC ANESTHESIA EA ADD 15 MINS

## 2025-02-26 PROCEDURE — 82962 GLUCOSE BLOOD TEST: CPT

## 2025-02-26 PROCEDURE — 27000284 HC CANNULA NASAL

## 2025-02-26 RX ORDER — SODIUM CHLORIDE 0.9 % (FLUSH) 0.9 %
10 SYRINGE (ML) INJECTION
Status: DISCONTINUED | OUTPATIENT
Start: 2025-02-26 | End: 2025-02-27 | Stop reason: HOSPADM

## 2025-02-26 RX ORDER — LIDOCAINE HYDROCHLORIDE 20 MG/ML
INJECTION, SOLUTION EPIDURAL; INFILTRATION; INTRACAUDAL; PERINEURAL
Status: DISCONTINUED | OUTPATIENT
Start: 2025-02-26 | End: 2025-02-26

## 2025-02-26 RX ORDER — PROPOFOL 10 MG/ML
VIAL (ML) INTRAVENOUS
Status: DISCONTINUED | OUTPATIENT
Start: 2025-02-26 | End: 2025-02-26

## 2025-02-26 RX ADMIN — PROPOFOL 20 MG: 10 INJECTION, EMULSION INTRAVENOUS at 02:02

## 2025-02-26 RX ADMIN — LIDOCAINE HYDROCHLORIDE 80 MG: 20 INJECTION, SOLUTION EPIDURAL; INFILTRATION; INTRACAUDAL; PERINEURAL at 02:02

## 2025-02-26 RX ADMIN — PROPOFOL 100 MG: 10 INJECTION, EMULSION INTRAVENOUS at 02:02

## 2025-02-26 RX ADMIN — PROPOFOL 50 MG: 10 INJECTION, EMULSION INTRAVENOUS at 02:02

## 2025-02-26 NOTE — DISCHARGE INSTRUCTIONS
Procedure Date  2/26/25     Impression  Overall Impression:   Moderate erythematous mucosa with erosion in the greater curve of the stomach and antrum, consistent with gastritis; performed cold forceps biopsies to rule out H. pylori  The upper third of the esophagus, middle third of the esophagus, lower third of the esophagus, Z-line, cardia, fundus of the stomach, incisura, duodenal bulb, 1st part of the duodenum and 2nd part of the duodenum appeared normal. Performed random biopsy to rule out eosinophilic esophagitis.  Dilated in the esophagus with Savary-Iva dilator to 51 Fr ending size. Dilation caused improved passage of the scope        Recommendation  Await pathology results  Continue current medications  Please schedule GB US to evaluate for radiolucent gallstones given postprandial symptoms  Monitor response to dilation   Ensure you are up to date with colonoscopy; if pain persists and US unremarkable, would consider colonoscopy pending timing of prior colonoscopy (performed at OSH, report not available)    Follow up in GI clinic as scheduled or as needed       Outcome of procedure: successful EGD  Disposition: patient to recovery following procedure; discharge to home when appropriate parameters met  Provisions for follow up: please call my office for any unexpected symptoms like chest or abdominal pain or bleeding following your procedure.    THE NURSE WILL CALL YOU WITH YOUR BIOPSY RESULTS IN A FEW DAYS. IF YOU HAVE  [facility] MYCHART YOUR RESULTS WILL APPEAR THERE AS WELL.NO DRIVING, OPERATING EQUIPMENT, OR SIGNING LEGAL DOCUMENTS FOR 24 HOURS.Please call the GI Lab if you have any nausea, vomiting, or abdominal pain.

## 2025-02-26 NOTE — ANESTHESIA PREPROCEDURE EVALUATION
02/26/2025  Leti Landeros is a 59 y.o., female.    Outpatient Medications as of 2/26/2025   Medication Sig Dispense Refill    aluminum chloride (DRYSOL DAB-O-MATIC) 20 % external solution Apply to underarms daily 60 mL 11    aspirin (ECOTRIN) 81 MG EC tablet Take 81 mg by mouth once daily.      atenoloL-chlorthalidone (TENORETIC) 50-25 mg Tab Take 1 tablet by mouth once daily.      atorvastatin (LIPITOR) 10 MG tablet Take 1 tablet (10 mg total) by mouth once daily. 90 tablet 3    chlorhexidine (HIBICLENS) 4 % external liquid Use as a daily wash to affected areas 473 mL 11    cyclobenzaprine (FLEXERIL) 10 MG tablet Take 1 tablet (10 mg total) by mouth 3 (three) times daily as needed for Muscle spasms. 30 tablet 1    diclofenac (VOLTAREN) 75 MG EC tablet Take 1 tablet (75 mg total) by mouth 2 (two) times daily. 60 tablet 0    empagliflozin (JARDIANCE) 25 mg tablet Take 1 tablet (25 mg total) by mouth once daily. 30 tablet 5    ergocalciferol (ERGOCALCIFEROL) 50,000 unit Cap Take 1 capsule (50,000 Units total) by mouth every 7 days. 4 capsule 5    HYDROcodone-acetaminophen (NORCO)  mg per tablet       meclizine (ANTIVERT) 25 mg tablet Take 1 tablet (25 mg total) by mouth 3 (three) times daily as needed for Dizziness. 30 tablet 2    MOUNJARO 15 mg/0.5 mL PnIj Inject 15 mg into the skin once a week. 4 Pen 5    multivitamin with minerals tablet Take 1 tablet by mouth once daily.      pantoprazole (PROTONIX) 40 MG tablet Take 1 tablet (40 mg total) by mouth once daily. 30 tablet 5    potassium chloride SA (K-DUR,KLOR-CON) 20 MEQ tablet Take 1 tablet (20 mEq total) by mouth 2 (two) times daily. (Patient not taking: Reported on 2/20/2025) 6 tablet 0    potassium citrate (UROCIT-K) 10 mEq (1,080 mg) TbSR Take 20 mEq by mouth.      tamsulosin (FLOMAX) 0.4 mg Cap Take 0.4 mg by mouth.       No current  facility-administered medications on file as of 2025.     Active Ambulatory Problems     Diagnosis Date Noted    Type 2 diabetes mellitus without complication, with long-term current use of insulin 2022    Bilateral carpal tunnel syndrome 2024    Right foot pain 2024    Essential hypertension 2024    Hidradenitis suppurativa 2025    Chronic back pain 2025     Resolved Ambulatory Problems     Diagnosis Date Noted    No Resolved Ambulatory Problems     Past Medical History:   Diagnosis Date    Carpal tunnel syndrome on both sides     Diabetes mellitus     Fibromyalgia     Hypertension     Scleroderma     Sinusitis      Past Surgical History:   Procedure Laterality Date     SECTION      DILATION AND CURETTAGE OF UTERUS      TONSILLECTOMY, ADENOIDECTOMY         Pre-op Assessment    I have reviewed the Patient Summary Reports.     I have reviewed the Nursing Notes. I have reviewed the NPO Status.   I have reviewed the Medications.     Review of Systems  Anesthesia Hx:  No problems with previous Anesthesia             Denies Family Hx of Anesthesia complications.    Denies Personal Hx of Anesthesia complications.                    Social:  Non-Smoker, No Alcohol Use       Hematology/Oncology:  Hematology Normal   Oncology Normal                                   EENT/Dental:  EENT/Dental Normal           Cardiovascular:     Hypertension           hyperlipidemia                               Pulmonary:  Pulmonary Normal                       Renal/:   renal calculi               Hepatic/GI:    Hiatal Hernia,     Taking GLP-1 Agonists Instructed to Hold for 7 Days No Reported GI Symptoms          Musculoskeletal:         Spine Disorders: lumbar Chronic Pain           Neurological:    Neuromuscular Disease,                                   Endocrine:  Diabetes, type 2           Dermatological:  Skin Normal    Psych:  Psychiatric Normal                    Chemistry         Component Value Date/Time     01/22/2024 1150    K 3.9 01/22/2024 1150     01/22/2024 1150    CO2 30 01/22/2024 1150    BUN 18 01/22/2024 1150    CREATININE 1.03 (H) 01/22/2024 1150     (H) 01/22/2024 1150        Component Value Date/Time    CALCIUM 10.3 (H) 01/22/2024 1150    ALKPHOS 69 01/17/2024 1709    AST 17 01/17/2024 1709    ALT 36 01/17/2024 1709    BILITOT 0.4 01/17/2024 1709    EGFRNONAA 92 04/07/2022 1041        Lab Results   Component Value Date    WBC 9.49 01/17/2024    HGB 15.0 01/17/2024    HCT 47.7 (H) 01/17/2024    MCV 86.3 01/17/2024     01/17/2024         Physical Exam  General: Well nourished, Cooperative, Alert and Oriented    Airway:  Mallampati: II   Mouth Opening: Normal  TM Distance: Normal  Tongue: Normal  Neck ROM: Normal ROM    Dental:  Intact    Chest/Lungs:  Normal Respiratory Rate        Anesthesia Plan  Type of Anesthesia, risks & benefits discussed:    Anesthesia Type: MAC  Intra-op Monitoring Plan: Standard ASA Monitors  Post Op Pain Control Plan: multimodal analgesia  Induction:  IV  Informed Consent: Informed consent signed with the Patient and all parties understand the risks and agree with anesthesia plan.  All questions answered. Patient consented to blood products? Yes  ASA Score: 2  Day of Surgery Review of History & Physical: H&P Update referred to the surgeon/provider.I have interviewed and examined the patient. I have reviewed the patient's H&P dated: There are no significant changes.     Ready For Surgery From Anesthesia Perspective.     .

## 2025-02-26 NOTE — H&P
Gastroenterology Pre-procedure H&P    History of Present Illness    Leti Landeros is a 59 y.o. female that  has a past medical history of Carpal tunnel syndrome on both sides, Diabetes mellitus, Fibromyalgia, Hypertension, Scleroderma, and Sinusitis.     Patient with GERD and abdominal pain here for EGD. She also complains of esophageal dysphagia to solids - points to epigastrium. Takes protonix without relief. CT abd with no calcified gallstones.       Past Medical History:   Diagnosis Date    Carpal tunnel syndrome on both sides     Diabetes mellitus     Fibromyalgia     Hypertension     Scleroderma     Sinusitis        Past Surgical History:   Procedure Laterality Date     SECTION      DILATION AND CURETTAGE OF UTERUS      TONSILLECTOMY, ADENOIDECTOMY         Family History   Problem Relation Name Age of Onset    Diabetes Mother      Cancer Father         Social History     Socioeconomic History    Marital status:    Tobacco Use    Smoking status: Never     Passive exposure: Never    Smokeless tobacco: Never   Substance and Sexual Activity    Alcohol use: Not Currently    Drug use: Never       Current Medications[1]    Review of patient's allergies indicates:   Allergen Reactions    Adhesive tape-silicones     Amlodipine Hives    Bactrim [sulfamethoxazole-trimethoprim]     Corticosteroids (glucocorticoids)     Lincocin [lincomycin]     Promethazine hcl     Sulfa (sulfonamide antibiotics)      Patient is also allergic to an unknown steroid drug    Trimethoprim Hives    Losartan Rash    Methocarbamol Nausea And Vomiting       Objective:  There were no vitals filed for this visit.     GEN: normal appearing, NAD, AAO x3  HENT: NCAT, anicteric, OP benign  CV: normal rate, regular rhythm  RESP: CTA, symmetric rise, unlabored  ABD: soft, ND, no guarding or TTP  SKIN: warm and dry  NEURO: grossly afocal    Assessment and Plan:    Proceed with:    EGD for GERD, abdominal pain       Len Kim,  MD  Gastroenterology         [1]   Current Outpatient Medications   Medication Sig Dispense Refill    aluminum chloride (DRYSOL DAB-O-MATIC) 20 % external solution Apply to underarms daily 60 mL 11    aspirin (ECOTRIN) 81 MG EC tablet Take 81 mg by mouth once daily.      atenoloL-chlorthalidone (TENORETIC) 50-25 mg Tab Take 1 tablet by mouth once daily.      atorvastatin (LIPITOR) 10 MG tablet Take 1 tablet (10 mg total) by mouth once daily. 90 tablet 3    chlorhexidine (HIBICLENS) 4 % external liquid Use as a daily wash to affected areas 473 mL 11    cyclobenzaprine (FLEXERIL) 10 MG tablet Take 1 tablet (10 mg total) by mouth 3 (three) times daily as needed for Muscle spasms. 30 tablet 1    diclofenac (VOLTAREN) 75 MG EC tablet Take 1 tablet (75 mg total) by mouth 2 (two) times daily. 60 tablet 0    empagliflozin (JARDIANCE) 25 mg tablet Take 1 tablet (25 mg total) by mouth once daily. 30 tablet 5    ergocalciferol (ERGOCALCIFEROL) 50,000 unit Cap Take 1 capsule (50,000 Units total) by mouth every 7 days. 4 capsule 5    HYDROcodone-acetaminophen (NORCO)  mg per tablet       meclizine (ANTIVERT) 25 mg tablet Take 1 tablet (25 mg total) by mouth 3 (three) times daily as needed for Dizziness. 30 tablet 2    MOUNJARO 15 mg/0.5 mL PnIj Inject 15 mg into the skin once a week. 4 Pen 5    multivitamin with minerals tablet Take 1 tablet by mouth once daily.      pantoprazole (PROTONIX) 40 MG tablet Take 1 tablet (40 mg total) by mouth once daily. 30 tablet 5    potassium chloride SA (K-DUR,KLOR-CON) 20 MEQ tablet Take 1 tablet (20 mEq total) by mouth 2 (two) times daily. (Patient not taking: Reported on 2/20/2025) 6 tablet 0    potassium citrate (UROCIT-K) 10 mEq (1,080 mg) TbSR Take 20 mEq by mouth.      tamsulosin (FLOMAX) 0.4 mg Cap Take 0.4 mg by mouth.       No current facility-administered medications for this encounter.

## 2025-02-26 NOTE — TRANSFER OF CARE
"Anesthesia Transfer of Care Note    Patient: Leti Landeros    Procedure(s) Performed: * EGD w/dilation     Patient location: GI    Anesthesia Type: MAC    Transport from OR: Transported from OR on room air with adequate spontaneous ventilation. Continuous ECG monitoring in transport. Continuous SpO2 monitoring in transport    Post pain: adequate analgesia    Post assessment: no apparent anesthetic complications    Post vital signs: stable    Level of consciousness: sedated and responds to stimulation    Nausea/Vomiting: no nausea/vomiting    Complications: none    Transfer of care protocol was followedComments: Good SV continue, NAD, VSS, RTRN      Last vitals: Visit Vitals  BP (!) 103/52 (BP Location: Right arm, Patient Position: Lying)   Pulse 74   Temp 36.1 °C (97 °F) (Skin)   Resp 12   Ht 5' 4" (1.626 m)   Wt 69.4 kg (153 lb)   LMP 03/15/2022 (Approximate)   SpO2 100%   BMI 26.26 kg/m²     "

## 2025-02-26 NOTE — ANESTHESIA POSTPROCEDURE EVALUATION
Anesthesia Post Evaluation    Patient: Leti Landeros    Procedure(s) Performed: *EGD w/dilation *    Final Anesthesia Type: MAC      Patient location during evaluation: GI PACU  Patient participation: Yes- Able to Participate  Level of consciousness: awake and alert  Post-procedure vital signs: reviewed and stable  Pain management: adequate  Airway patency: patent    PONV status at discharge: No PONV  Anesthetic complications: no      Cardiovascular status: blood pressure returned to baseline and hemodynamically stable  Respiratory status: unassisted, spontaneous ventilation and room air  Hydration status: euvolemic  Follow-up not needed.  Comments: Refer to nursing notes for pain/farzana score upon discharge from recovery.              Vitals Value Taken Time   /52 02/26/25 14:39   Temp 36.1 °C (97 °F) 02/26/25 14:39   Pulse 74 02/26/25 14:39   Resp 12 02/26/25 14:39   SpO2 100 % 02/26/25 14:39         No case tracking events are documented in the log.      Pain/Farzana Score: No data recorded

## 2025-02-27 ENCOUNTER — TELEPHONE (OUTPATIENT)
Dept: GASTROENTEROLOGY | Facility: CLINIC | Age: 60
End: 2025-02-27
Payer: MEDICAID

## 2025-02-27 NOTE — TELEPHONE ENCOUNTER
Spoke with pt and informed her recovery was calling to check on her after EGD yesterday. Pt stated she has some fluttering in her stomach but that is all.

## 2025-02-27 NOTE — TELEPHONE ENCOUNTER
----- Message from Lenore sent at 2/27/2025  8:46 AM CST -----  Regarding: Return Call  Who Called: Leti Rosa PouPatient is returning phone callWho Left Message for Patient: UnsureDoes the patient know what this is regarding?: NoPreferred Method of Contact: Phone CallPatient's Preferred Phone Number on File: [phone] Best Call Back Number, if different:Additional Information:

## 2025-02-28 ENCOUNTER — RESULTS FOLLOW-UP (OUTPATIENT)
Dept: GASTROENTEROLOGY | Facility: HOSPITAL | Age: 60
End: 2025-02-28
Payer: MEDICAID

## 2025-02-28 LAB
ESTROGEN SERPL-MCNC: NORMAL PG/ML
INSULIN SERPL-ACNC: NORMAL U[IU]/ML
LAB AP GROSS DESCRIPTION: NORMAL
LAB AP LABORATORY NOTES: NORMAL
T3RU NFR SERPL: NORMAL %

## 2025-03-05 ENCOUNTER — OFFICE VISIT (OUTPATIENT)
Dept: FAMILY MEDICINE | Facility: CLINIC | Age: 60
End: 2025-03-05
Payer: MEDICAID

## 2025-03-05 VITALS
RESPIRATION RATE: 16 BRPM | SYSTOLIC BLOOD PRESSURE: 157 MMHG | DIASTOLIC BLOOD PRESSURE: 86 MMHG | OXYGEN SATURATION: 100 % | HEART RATE: 84 BPM | TEMPERATURE: 98 F | BODY MASS INDEX: 26.63 KG/M2 | HEIGHT: 64 IN | WEIGHT: 156 LBS

## 2025-03-05 DIAGNOSIS — J01.90 ACUTE NON-RECURRENT SINUSITIS, UNSPECIFIED LOCATION: ICD-10-CM

## 2025-03-05 DIAGNOSIS — Z12.4 SCREENING FOR MALIGNANT NEOPLASM OF CERVIX: ICD-10-CM

## 2025-03-05 DIAGNOSIS — I10 ESSENTIAL HYPERTENSION: ICD-10-CM

## 2025-03-05 DIAGNOSIS — F33.1 MODERATE EPISODE OF RECURRENT MAJOR DEPRESSIVE DISORDER: Primary | ICD-10-CM

## 2025-03-05 PROCEDURE — 99214 OFFICE O/P EST MOD 30 MIN: CPT | Mod: ,,, | Performed by: FAMILY MEDICINE

## 2025-03-05 PROCEDURE — 3077F SYST BP >= 140 MM HG: CPT | Mod: CPTII,,, | Performed by: FAMILY MEDICINE

## 2025-03-05 PROCEDURE — 3079F DIAST BP 80-89 MM HG: CPT | Mod: CPTII,,, | Performed by: FAMILY MEDICINE

## 2025-03-05 PROCEDURE — 1160F RVW MEDS BY RX/DR IN RCRD: CPT | Mod: CPTII,,, | Performed by: FAMILY MEDICINE

## 2025-03-05 PROCEDURE — 3008F BODY MASS INDEX DOCD: CPT | Mod: CPTII,,, | Performed by: FAMILY MEDICINE

## 2025-03-05 PROCEDURE — 1159F MED LIST DOCD IN RCRD: CPT | Mod: CPTII,,, | Performed by: FAMILY MEDICINE

## 2025-03-05 PROCEDURE — 3044F HG A1C LEVEL LT 7.0%: CPT | Mod: CPTII,,, | Performed by: FAMILY MEDICINE

## 2025-03-05 RX ORDER — AZITHROMYCIN 250 MG/1
TABLET, FILM COATED ORAL
Qty: 6 TABLET | Refills: 0 | Status: SHIPPED | OUTPATIENT
Start: 2025-03-05 | End: 2025-03-10

## 2025-03-05 RX ORDER — FLUOXETINE 10 MG/1
10 CAPSULE ORAL DAILY
Qty: 30 CAPSULE | Refills: 11 | Status: SHIPPED | OUTPATIENT
Start: 2025-03-05 | End: 2026-03-05
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
## 2025-03-27 ENCOUNTER — OFFICE VISIT (OUTPATIENT)
Dept: FAMILY MEDICINE | Facility: CLINIC | Age: 60
End: 2025-03-27
Payer: MEDICAID

## 2025-03-27 VITALS
BODY MASS INDEX: 25.06 KG/M2 | TEMPERATURE: 98 F | WEIGHT: 146.81 LBS | SYSTOLIC BLOOD PRESSURE: 114 MMHG | OXYGEN SATURATION: 99 % | DIASTOLIC BLOOD PRESSURE: 70 MMHG | HEART RATE: 74 BPM | HEIGHT: 64 IN | RESPIRATION RATE: 16 BRPM

## 2025-03-27 DIAGNOSIS — G47.00 INSOMNIA, UNSPECIFIED TYPE: ICD-10-CM

## 2025-03-27 DIAGNOSIS — F41.9 ANXIETY: ICD-10-CM

## 2025-03-27 DIAGNOSIS — F33.1 MODERATE EPISODE OF RECURRENT MAJOR DEPRESSIVE DISORDER: Primary | ICD-10-CM

## 2025-03-27 DIAGNOSIS — E11.9 TYPE 2 DIABETES MELLITUS WITHOUT COMPLICATION, WITHOUT LONG-TERM CURRENT USE OF INSULIN: ICD-10-CM

## 2025-03-27 PROCEDURE — 3074F SYST BP LT 130 MM HG: CPT | Mod: CPTII,,, | Performed by: FAMILY MEDICINE

## 2025-03-27 PROCEDURE — 3008F BODY MASS INDEX DOCD: CPT | Mod: CPTII,,, | Performed by: FAMILY MEDICINE

## 2025-03-27 PROCEDURE — 3044F HG A1C LEVEL LT 7.0%: CPT | Mod: CPTII,,, | Performed by: FAMILY MEDICINE

## 2025-03-27 PROCEDURE — 1160F RVW MEDS BY RX/DR IN RCRD: CPT | Mod: CPTII,,, | Performed by: FAMILY MEDICINE

## 2025-03-27 PROCEDURE — 99214 OFFICE O/P EST MOD 30 MIN: CPT | Mod: ,,, | Performed by: FAMILY MEDICINE

## 2025-03-27 PROCEDURE — 1159F MED LIST DOCD IN RCRD: CPT | Mod: CPTII,,, | Performed by: FAMILY MEDICINE

## 2025-03-27 PROCEDURE — 3078F DIAST BP <80 MM HG: CPT | Mod: CPTII,,, | Performed by: FAMILY MEDICINE

## 2025-03-27 RX ORDER — TRAZODONE HYDROCHLORIDE 50 MG/1
50 TABLET ORAL NIGHTLY PRN
Qty: 30 TABLET | Refills: 5 | Status: SHIPPED | OUTPATIENT
Start: 2025-03-27 | End: 2026-03-27

## 2025-03-27 RX ORDER — FLUOXETINE HYDROCHLORIDE 20 MG/1
20 CAPSULE ORAL DAILY
Qty: 30 CAPSULE | Refills: 5 | Status: SHIPPED | OUTPATIENT
Start: 2025-03-27 | End: 2026-03-27

## 2025-03-27 NOTE — PROGRESS NOTES
New Clinic Note    Leti Landeros is a 59 y.o. female     CC:   Chief Complaint   Patient presents with    Depression     Patient is here to follow after being started on Prozac. She sates she is having difficulty sleeping at night. She just feels jittery and feels like she is choking or not getting enough oxygen. She has episodes when she just feels like she wants to cry.     Anxiety    Health Maintenance     Hepatitis C Screening Never done refused  HIV Screening Never done refused  TETANUS VACCINE Never done refused  Mammogram past due buit refuses mammo right now  Colorectal Cancer Screening Never done refuses  Shingles Vaccine(1 of 2) Never done refuses  Pneumococcal Vaccines (Age 50+)(1 of 1 - PCV) Never done refuses  Cervical Cancer Screening has PAP scheduled for 04/07/2025.   COVID-19 Vaccine(1 - 2024-25 season) Never done refuses    Diabetes     Patient recently had an upper scope done and was  off Mounjaro for two weeks. She said before she had been having problems with her vision. She said while she was off Mounjaro her vision got better and she could see better. She said once she started back on it her vision started getting blurry again.         Subjective    History of Present Illness HPI   Patient is here for a follow up on her depression and anxiety. She is tolerating the prozac. She says it might have made a slight difference in her depression. She is having insomnia. Patient complains of blurry vision. She says she came off of her Mounjaro for 2 weeks and her blurry vision got better. Patient says she saw her rheumatologist and they said she does not have scleroderma. They said she has fibromyalgia according to the patient.   Current Medications[1]     Past Medical History:   Diagnosis Date    Carpal tunnel syndrome on both sides     Diabetes mellitus     Fibromyalgia     Hypertension     Scleroderma     Sinusitis         Family History   Problem Relation Name Age of Onset    Diabetes Mother    "   Cancer Father          Past Surgical History:   Procedure Laterality Date     SECTION      DILATION AND CURETTAGE OF UTERUS      TONSILLECTOMY, ADENOIDECTOMY          Review of Systems   Constitutional:  Negative for fatigue and fever.   HENT:  Negative for ear pain, postnasal drip, rhinorrhea and sinus pressure/congestion.    Respiratory:  Negative for cough and shortness of breath.    Cardiovascular:  Negative for chest pain.   Gastrointestinal:  Negative for abdominal pain, diarrhea, nausea and vomiting.   Genitourinary:  Negative for dysuria.   Neurological:  Negative for headaches.   Psychiatric/Behavioral:  Positive for depressed mood. The patient is nervous/anxious.         /70 (BP Location: Left arm, Patient Position: Standing)   Pulse 74   Temp 98.4 °F (36.9 °C) (Oral)   Resp 16   Ht 5' 4" (1.626 m)   Wt 66.6 kg (146 lb 12.8 oz)   LMP 03/15/2022 (Approximate) Comment: patient states she has irregular periods d/t menopause  SpO2 99%   BMI 25.20 kg/m²      Physical Exam  HENT:      Head: Normocephalic and atraumatic.   Cardiovascular:      Rate and Rhythm: Normal rate and regular rhythm.   Pulmonary:      Effort: Pulmonary effort is normal.      Breath sounds: Normal breath sounds.   Neurological:      Mental Status: She is alert and oriented to person, place, and time.   Psychiatric:         Mood and Affect: Mood normal.         Behavior: Behavior normal.          Assessment and Plan      ICD-10-CM ICD-9-CM   1. Moderate episode of recurrent major depressive disorder  F33.1 296.32   2. Anxiety  F41.9 300.00   3. Insomnia, unspecified type  G47.00 780.52   4. Type 2 diabetes mellitus without complication, without long-term current use of insulin  E11.9 250.00        1. Moderate episode of recurrent major depressive disorder  Not controlled. Increase fluoxetine to 20 mg. Follow up in 1 month.   -     FLUoxetine 20 MG capsule; Take 1 capsule (20 mg total) by mouth once daily.  Dispense: " 30 capsule; Refill: 5    2. Anxiety  Not controlled. Increase fluoxetine to 20 mg. Follow up in 1 month.   -     FLUoxetine 20 MG capsule; Take 1 capsule (20 mg total) by mouth once daily.  Dispense: 30 capsule; Refill: 5    3. Insomnia, unspecified type  Not controlled. Start trazodone.   -     traZODone (DESYREL) 50 MG tablet; Take 1 tablet (50 mg total) by mouth nightly as needed for Insomnia.  Dispense: 30 tablet; Refill: 5    4. Type 2 diabetes mellitus without complication, without long-term current use of insulin  Controlled. Offered to stop Mounjaro or switch to a different GLP1 if she thought it was interfering with her vision. Patient refused.         Follow up in about 4 weeks (around 4/24/2025).              [1]   Current Outpatient Medications:     aluminum chloride (DRYSOL DAB-O-MATIC) 20 % external solution, Apply to underarms daily, Disp: 60 mL, Rfl: 11    atenoloL-chlorthalidone (TENORETIC) 50-25 mg Tab, Take 1 tablet by mouth once daily., Disp: , Rfl:     atorvastatin (LIPITOR) 10 MG tablet, Take 1 tablet (10 mg total) by mouth once daily., Disp: 90 tablet, Rfl: 3    chlorhexidine (HIBICLENS) 4 % external liquid, Use as a daily wash to affected areas, Disp: 473 mL, Rfl: 11    empagliflozin (JARDIANCE) 25 mg tablet, Take 1 tablet (25 mg total) by mouth once daily., Disp: 30 tablet, Rfl: 5    ergocalciferol (ERGOCALCIFEROL) 50,000 unit Cap, Take 1 capsule (50,000 Units total) by mouth every 7 days., Disp: 4 capsule, Rfl: 5    linaCLOtide (LINZESS) 145 mcg Cap capsule, Take 1 capsule (145 mcg total) by mouth before breakfast., Disp: 30 capsule, Rfl: 11    meclizine (ANTIVERT) 25 mg tablet, Take 1 tablet (25 mg total) by mouth 3 (three) times daily as needed for Dizziness., Disp: 30 tablet, Rfl: 2    MOUNJARO 15 mg/0.5 mL PnIj, Inject 15 mg into the skin once a week., Disp: 4 Pen, Rfl: 5    multivitamin with minerals tablet, Take 1 tablet by mouth once daily., Disp: , Rfl:     pantoprazole (PROTONIX)  40 MG tablet, Take 1 tablet (40 mg total) by mouth once daily., Disp: 30 tablet, Rfl: 5    potassium citrate (UROCIT-K) 10 mEq (1,080 mg) TbSR, Take 20 mEq by mouth., Disp: , Rfl:     tamsulosin (FLOMAX) 0.4 mg Cap, Take 0.4 mg by mouth., Disp: , Rfl:     FLUoxetine 20 MG capsule, Take 1 capsule (20 mg total) by mouth once daily., Disp: 30 capsule, Rfl: 5    traZODone (DESYREL) 50 MG tablet, Take 1 tablet (50 mg total) by mouth nightly as needed for Insomnia., Disp: 30 tablet, Rfl: 5

## 2025-04-01 ENCOUNTER — OFFICE VISIT (OUTPATIENT)
Dept: OBSTETRICS AND GYNECOLOGY | Facility: CLINIC | Age: 60
End: 2025-04-01
Payer: MEDICAID

## 2025-04-01 VITALS
HEIGHT: 64 IN | OXYGEN SATURATION: 99 % | BODY MASS INDEX: 24.75 KG/M2 | SYSTOLIC BLOOD PRESSURE: 133 MMHG | DIASTOLIC BLOOD PRESSURE: 81 MMHG | WEIGHT: 145 LBS | HEART RATE: 74 BPM

## 2025-04-01 DIAGNOSIS — K59.04 CHRONIC IDIOPATHIC CONSTIPATION: Chronic | ICD-10-CM

## 2025-04-01 DIAGNOSIS — Z12.4 SCREENING FOR MALIGNANT NEOPLASM OF CERVIX: ICD-10-CM

## 2025-04-01 DIAGNOSIS — Z11.51 SCREENING FOR HPV (HUMAN PAPILLOMAVIRUS): ICD-10-CM

## 2025-04-01 DIAGNOSIS — Z01.419 WOMEN'S ANNUAL ROUTINE GYNECOLOGICAL EXAMINATION: Primary | ICD-10-CM

## 2025-04-01 PROCEDURE — 3008F BODY MASS INDEX DOCD: CPT | Mod: CPTII,,, | Performed by: ADVANCED PRACTICE MIDWIFE

## 2025-04-01 PROCEDURE — 99396 PREV VISIT EST AGE 40-64: CPT | Mod: ,,, | Performed by: ADVANCED PRACTICE MIDWIFE

## 2025-04-01 PROCEDURE — 3079F DIAST BP 80-89 MM HG: CPT | Mod: CPTII,,, | Performed by: ADVANCED PRACTICE MIDWIFE

## 2025-04-01 PROCEDURE — 3044F HG A1C LEVEL LT 7.0%: CPT | Mod: CPTII,,, | Performed by: ADVANCED PRACTICE MIDWIFE

## 2025-04-01 PROCEDURE — 3075F SYST BP GE 130 - 139MM HG: CPT | Mod: CPTII,,, | Performed by: ADVANCED PRACTICE MIDWIFE

## 2025-04-01 PROCEDURE — 1159F MED LIST DOCD IN RCRD: CPT | Mod: CPTII,,, | Performed by: ADVANCED PRACTICE MIDWIFE

## 2025-04-01 NOTE — PROGRESS NOTES
"  Patient ID: Leti Landeros is a 59 y.o. female     Chief Complaint:   Chief Complaint   Patient presents with    Health Maintenance     Patient refused mammogram at this time.        HPI: Leti Landeros is a 59 y.o. female who presents for well woman exam with Pap. Previous Paps NILM , , 2016, 2014. Postmenopausal. Reports PMB twice in , bleeding lasted 3 days. Discussed PMB as abnormal and need for additional testing to r/o uterine cancer or other abnormality. Reports hx uterine fibroids. Declines additional testing including pelvic US. Will follow up if changes mind. Requesting resending linzess rx sent in by Dr. Kim for chronic constipation. Was told by pharmacy PA was needed and has not been sent.   LMP: Patient's last menstrual period was 03/15/2022 (approximate).  Sexually active: no   Contraceptive: n/a  Mammogram: declines mammogram, states has health problems to manage     Past Medical History:   Diagnosis Date    ARRON positive     Carpal tunnel syndrome on both sides     Chronic idiopathic constipation     dx by Dr. Kim    Diabetes mellitus     Fibromyalgia     GERD (gastroesophageal reflux disease)     History of renal stone     Hypertension     Moderate major depression     Scleroderma     Sinusitis        Past Surgical History:   Procedure Laterality Date     SECTION      DILATION AND CURETTAGE OF UTERUS      TONSILLECTOMY, ADENOIDECTOMY         Social History[1]    Family History   Problem Relation Name Age of Onset    Diabetes Mother      Cancer Father         OB History          3    Para   1    Term                AB   2    Living             SAB   2    IAB        Ectopic        Multiple        Live Births   1                 /81 (BP Location: Left arm, Patient Position: Sitting)   Pulse 74   Ht 5' 4" (1.626 m)   Wt 65.8 kg (145 lb)   LMP 03/15/2022 (Approximate) Comment: patient states she has irregular periods d/t menopause  SpO2 99%   " BMI 24.89 kg/m²     Wt Readings from Last 3 Encounters:   04/01/25 65.8 kg (145 lb)   03/27/25 66.6 kg (146 lb 12.8 oz)   03/05/25 70.8 kg (156 lb)        Review of Systems   Constitutional: Negative.    Eyes: Negative.    Respiratory: Negative.     Cardiovascular: Negative.    Gastrointestinal: Negative.    Endocrine: Negative.    Genitourinary: Negative.    Musculoskeletal: Negative.    Integumentary:  Negative.   Neurological: Negative.    Hematological: Negative.    Psychiatric/Behavioral: Negative.     Breast: negative.         Physical Exam   GENERAL: Well-developed, well-nourished female in no acute distress  NECK: Supple with full range of motion. No adenopathy, masses, or thyromegaly  SKIN: Normal to inspection with no rashes, lesions, or ulcers  LUNGS: Clear bilaterally with no rales, rhonchi, or wheezes   CARDIOVASCULAR AUSCULTATION: Normal heart rate and rhythm  BREASTS: No masses, lumps, discharge, tenderness, or skin changes  LYMPH NODES: No axillary, or inguinal adenopathy  ABDOMEN: Soft, non tender, without masses. No palpable hernia or  hepatosplenomegaly  VULVA: General appearance normal; external genitalia without lesions or rash  URETHRA: Normal size and location with no lesions or prolapse  VAGINA: Mucosa normal, no discharge or lesions  CERVIX: Pink without lesions, discharge or tenderness  UTERUS: Regular, mobile, and non tender;  consistency is normal. No evidence of adnexa masses, tenderness, or nodularity  ANUS: No lesions or relaxation.  LOWER EXTREMITIES: No edema or tenderness  PSYCHIATRIC: Patient is oriented to person, place, and time. Mood and affect are normal      Assessment:  Women's annual routine gynecological examination  -     ThinPrep Pap Test; Future; Expected date: 04/01/2025    Screening for malignant neoplasm of cervix  -     Ambulatory referral/consult to Gynecology  -     ThinPrep Pap Test; Future; Expected date: 04/01/2025    Chronic idiopathic constipation  -      linaCLOtide (LINZESS) 145 mcg Cap capsule; Take 1 capsule (145 mcg total) by mouth before breakfast.  Dispense: 30 capsule; Refill: 11    Screening for HPV (human papillomavirus)  -     ThinPrep Pap Test; Future; Expected date: 04/01/2025    BMI 24.0-24.9, adult          ICD-10-CM ICD-9-CM    1. Women's annual routine gynecological examination  Z01.419 V72.31 ThinPrep Pap Test      2. Screening for malignant neoplasm of cervix  Z12.4 V76.2 Ambulatory referral/consult to Gynecology      ThinPrep Pap Test      3. Chronic idiopathic constipation  K59.04 564.00 linaCLOtide (LINZESS) 145 mcg Cap capsule      4. Screening for HPV (human papillomavirus)  Z11.51 V73.81 ThinPrep Pap Test      5. BMI 24.0-24.9, adult  Z68.24 V85.1           Plan:  Annual exam completed. Pap specimen collected  Will call or send My Chart message after results reviewed  Patient was counseled today on ASCCP Pap with HPV screening guidelines and recommendations for yearly pelvic exams   Encouraged healthy dietary choices, increasing water intake, and exercising at least 3 x weekly  Encouraged follow up if PMB reoccurs   Rx sent for Linzess as previously ordered by Dr. Kim    Follow up in about 1 year (around 4/1/2026) for annual gyn exam.                         [1]   Social History  Socioeconomic History    Marital status:    Tobacco Use    Smoking status: Never     Passive exposure: Never    Smokeless tobacco: Never   Substance and Sexual Activity    Alcohol use: Not Currently    Drug use: Never

## 2025-04-08 PROCEDURE — 88142 CYTOPATH C/V THIN LAYER: CPT | Mod: TC,GCY | Performed by: ADVANCED PRACTICE MIDWIFE

## 2025-04-14 DIAGNOSIS — Z11.51 SCREENING FOR HPV (HUMAN PAPILLOMAVIRUS): Primary | ICD-10-CM

## 2025-04-15 ENCOUNTER — RESULTS FOLLOW-UP (OUTPATIENT)
Facility: CLINIC | Age: 60
End: 2025-04-15

## 2025-04-18 ENCOUNTER — RESULTS FOLLOW-UP (OUTPATIENT)
Facility: CLINIC | Age: 60
End: 2025-04-18

## 2025-04-28 ENCOUNTER — OFFICE VISIT (OUTPATIENT)
Dept: FAMILY MEDICINE | Facility: CLINIC | Age: 60
End: 2025-04-28
Payer: MEDICAID

## 2025-04-28 VITALS
RESPIRATION RATE: 18 BRPM | HEART RATE: 84 BPM | BODY MASS INDEX: 24.41 KG/M2 | OXYGEN SATURATION: 98 % | TEMPERATURE: 98 F | HEIGHT: 64 IN | WEIGHT: 143 LBS | DIASTOLIC BLOOD PRESSURE: 70 MMHG | SYSTOLIC BLOOD PRESSURE: 114 MMHG

## 2025-04-28 DIAGNOSIS — R11.0 NAUSEA: Primary | ICD-10-CM

## 2025-04-28 DIAGNOSIS — R19.7 DIARRHEA, UNSPECIFIED TYPE: ICD-10-CM

## 2025-04-28 DIAGNOSIS — F33.1 MODERATE EPISODE OF RECURRENT MAJOR DEPRESSIVE DISORDER: ICD-10-CM

## 2025-04-28 DIAGNOSIS — F41.9 ANXIETY: ICD-10-CM

## 2025-04-28 LAB
ANION GAP SERPL CALCULATED.3IONS-SCNC: 16 MMOL/L (ref 7–16)
BASOPHILS # BLD AUTO: 0.01 K/UL (ref 0–0.2)
BASOPHILS NFR BLD AUTO: 0.1 % (ref 0–1)
BUN SERPL-MCNC: 19 MG/DL (ref 10–20)
BUN/CREAT SERPL: 18 (ref 6–20)
CALCIUM SERPL-MCNC: 9.5 MG/DL (ref 8.4–10.2)
CHLORIDE SERPL-SCNC: 109 MMOL/L (ref 98–107)
CO2 SERPL-SCNC: 19 MMOL/L (ref 22–29)
CREAT SERPL-MCNC: 1.08 MG/DL (ref 0.55–1.02)
DIFFERENTIAL METHOD BLD: ABNORMAL
EGFR (NO RACE VARIABLE) (RUSH/TITUS): 59 ML/MIN/1.73M2
EOSINOPHIL # BLD AUTO: 0.01 K/UL (ref 0–0.5)
EOSINOPHIL NFR BLD AUTO: 0.1 % (ref 1–4)
ERYTHROCYTE [DISTWIDTH] IN BLOOD BY AUTOMATED COUNT: 14.3 % (ref 11.5–14.5)
GLUCOSE SERPL-MCNC: 147 MG/DL (ref 74–100)
HCT VFR BLD AUTO: 45.8 % (ref 38–47)
HGB BLD-MCNC: 14.2 G/DL (ref 12–16)
IMM GRANULOCYTES # BLD AUTO: 0.01 K/UL (ref 0–0.04)
IMM GRANULOCYTES NFR BLD: 0.1 % (ref 0–0.4)
LYMPHOCYTES # BLD AUTO: 1.48 K/UL (ref 1–4.8)
LYMPHOCYTES NFR BLD AUTO: 20.3 % (ref 27–41)
MCH RBC QN AUTO: 27.9 PG (ref 27–31)
MCHC RBC AUTO-ENTMCNC: 31 G/DL (ref 32–36)
MCV RBC AUTO: 90 FL (ref 80–96)
MONOCYTES # BLD AUTO: 0.92 K/UL (ref 0–0.8)
MONOCYTES NFR BLD AUTO: 12.6 % (ref 2–6)
MPC BLD CALC-MCNC: 10 FL (ref 9.4–12.4)
NEUTROPHILS # BLD AUTO: 4.85 K/UL (ref 1.8–7.7)
NEUTROPHILS NFR BLD AUTO: 66.8 % (ref 53–65)
NRBC # BLD AUTO: 0 X10E3/UL
NRBC, AUTO (.00): 0 %
PLATELET # BLD AUTO: 323 K/UL (ref 150–400)
POTASSIUM SERPL-SCNC: 3.4 MMOL/L (ref 3.5–5.1)
RBC # BLD AUTO: 5.09 M/UL (ref 4.2–5.4)
SODIUM SERPL-SCNC: 141 MMOL/L (ref 136–145)
WBC # BLD AUTO: 7.28 K/UL (ref 4.5–11)

## 2025-04-28 PROCEDURE — 1159F MED LIST DOCD IN RCRD: CPT | Mod: CPTII,,, | Performed by: FAMILY MEDICINE

## 2025-04-28 PROCEDURE — 3078F DIAST BP <80 MM HG: CPT | Mod: CPTII,,, | Performed by: FAMILY MEDICINE

## 2025-04-28 PROCEDURE — 85025 COMPLETE CBC W/AUTO DIFF WBC: CPT | Mod: ,,, | Performed by: CLINICAL MEDICAL LABORATORY

## 2025-04-28 PROCEDURE — 80048 BASIC METABOLIC PNL TOTAL CA: CPT | Mod: ,,, | Performed by: CLINICAL MEDICAL LABORATORY

## 2025-04-28 PROCEDURE — 1160F RVW MEDS BY RX/DR IN RCRD: CPT | Mod: CPTII,,, | Performed by: FAMILY MEDICINE

## 2025-04-28 PROCEDURE — 3074F SYST BP LT 130 MM HG: CPT | Mod: CPTII,,, | Performed by: FAMILY MEDICINE

## 2025-04-28 PROCEDURE — 3044F HG A1C LEVEL LT 7.0%: CPT | Mod: CPTII,,, | Performed by: FAMILY MEDICINE

## 2025-04-28 PROCEDURE — 96372 THER/PROPH/DIAG INJ SC/IM: CPT | Mod: ,,, | Performed by: FAMILY MEDICINE

## 2025-04-28 PROCEDURE — 99214 OFFICE O/P EST MOD 30 MIN: CPT | Mod: 25,,, | Performed by: FAMILY MEDICINE

## 2025-04-28 PROCEDURE — 3008F BODY MASS INDEX DOCD: CPT | Mod: CPTII,,, | Performed by: FAMILY MEDICINE

## 2025-04-28 RX ORDER — ONDANSETRON 2 MG/ML
4 INJECTION INTRAMUSCULAR; INTRAVENOUS
Status: COMPLETED | OUTPATIENT
Start: 2025-04-28 | End: 2025-04-28

## 2025-04-28 RX ORDER — NITROGLYCERIN 0.4 MG/1
1 TABLET SUBLINGUAL EVERY 5 MIN PRN
COMMUNITY
Start: 2024-12-03

## 2025-04-28 RX ORDER — ONDANSETRON 8 MG/1
8 TABLET, ORALLY DISINTEGRATING ORAL EVERY 8 HOURS PRN
Qty: 20 TABLET | Refills: 0 | Status: SHIPPED | OUTPATIENT
Start: 2025-04-28

## 2025-04-28 RX ORDER — CYCLOBENZAPRINE HCL 10 MG
10 TABLET ORAL 3 TIMES DAILY PRN
COMMUNITY
Start: 2025-02-20

## 2025-04-28 RX ADMIN — ONDANSETRON 4 MG: 2 INJECTION INTRAMUSCULAR; INTRAVENOUS at 04:04

## 2025-04-28 NOTE — PROGRESS NOTES
"New Clinic Note    Leti Landeros is a 59 y.o. female     CC:   Chief Complaint   Patient presents with    Follow-up     1 month    Weakness    Nausea     Weakness, nausea, and diarrhea started last night/this morning    Diarrhea        Subjective    History of Present Illness HPI   Patient complains of diarrhea and nausea that started yesterday. She has tried guillaume without relief. She is able to keep fluids down. She reports that her depression and anxiety have improved. She is tolerating her medication.   Current Medications[1]     Past Medical History:   Diagnosis Date    ARRON positive     Carpal tunnel syndrome on both sides     Chronic idiopathic constipation     dx by Dr. Kim    Diabetes mellitus     Fibromyalgia     GERD (gastroesophageal reflux disease)     History of renal stone     Hypertension     Moderate major depression     Scleroderma     Sinusitis         Family History   Problem Relation Name Age of Onset    Diabetes Mother      Cancer Father          Past Surgical History:   Procedure Laterality Date     SECTION      DILATION AND CURETTAGE OF UTERUS      TONSILLECTOMY, ADENOIDECTOMY          Review of Systems   Constitutional:  Negative for fatigue and fever.   HENT:  Negative for ear pain, postnasal drip, rhinorrhea and sinus pressure/congestion.    Respiratory:  Negative for cough and shortness of breath.    Cardiovascular:  Negative for chest pain.   Gastrointestinal:  Positive for diarrhea and nausea. Negative for abdominal pain and vomiting.   Genitourinary:  Negative for dysuria.   Neurological:  Negative for headaches.        /70 (BP Location: Left arm, Patient Position: Sitting)   Pulse 84   Temp 98 °F (36.7 °C) (Oral)   Resp 18   Ht 5' 4" (1.626 m)   Wt 64.9 kg (143 lb)   LMP 03/15/2022 (Approximate) Comment: patient states she has irregular periods d/t menopause  SpO2 98%   BMI 24.55 kg/m²      Physical Exam  HENT:      Head: Normocephalic and atraumatic. "   Cardiovascular:      Rate and Rhythm: Normal rate and regular rhythm.   Pulmonary:      Effort: Pulmonary effort is normal.      Breath sounds: Normal breath sounds.   Neurological:      Mental Status: She is alert and oriented to person, place, and time.   Psychiatric:         Mood and Affect: Mood normal.         Behavior: Behavior normal.          Assessment and Plan      ICD-10-CM ICD-9-CM   1. Nausea  R11.0 787.02   2. Diarrhea, unspecified type  R19.7 787.91   3. Anxiety  F41.9 300.00   4. Moderate episode of recurrent major depressive disorder  F33.1 296.32        1. Nausea  Liquid diet. Advance as tolerated.   -     ondansetron injection 4 mg  -     ondansetron (ZOFRAN-ODT) 8 MG TbDL; Take 1 tablet (8 mg total) by mouth every 8 (eight) hours as needed (nausea).  Dispense: 20 tablet; Refill: 0  -     CBC Auto Differential; Future; Expected date: 04/28/2025  -     Basic Metabolic Panel; Future; Expected date: 04/28/2025    2. Diarrhea, unspecified type    3. Anxiety  The current medical regimen is effective;  continue present plan and medications.    4. Moderate episode of recurrent major depressive disorder  The current medical regimen is effective;  continue present plan and medications.          Follow up if symptoms worsen or fail to improve.            [1]   Current Outpatient Medications:     aluminum chloride (DRYSOL DAB-O-MATIC) 20 % external solution, Apply to underarms daily, Disp: 60 mL, Rfl: 11    atenoloL-chlorthalidone (TENORETIC) 50-25 mg Tab, Take 1 tablet by mouth once daily., Disp: , Rfl:     atorvastatin (LIPITOR) 10 MG tablet, Take 1 tablet (10 mg total) by mouth once daily., Disp: 90 tablet, Rfl: 3    chlorhexidine (HIBICLENS) 4 % external liquid, Use as a daily wash to affected areas, Disp: 473 mL, Rfl: 11    cyclobenzaprine (FLEXERIL) 10 MG tablet, Take 10 mg by mouth 3 (three) times daily as needed., Disp: , Rfl:     empagliflozin (JARDIANCE) 25 mg tablet, Take 1 tablet (25 mg total)  by mouth once daily., Disp: 30 tablet, Rfl: 5    ergocalciferol (ERGOCALCIFEROL) 50,000 unit Cap, Take 1 capsule (50,000 Units total) by mouth every 7 days., Disp: 4 capsule, Rfl: 5    FLUoxetine 20 MG capsule, Take 1 capsule (20 mg total) by mouth once daily., Disp: 30 capsule, Rfl: 5    linaCLOtide (LINZESS) 145 mcg Cap capsule, Take 1 capsule (145 mcg total) by mouth before breakfast., Disp: 30 capsule, Rfl: 11    meclizine (ANTIVERT) 25 mg tablet, Take 1 tablet (25 mg total) by mouth 3 (three) times daily as needed for Dizziness., Disp: 30 tablet, Rfl: 2    MOUNJARO 15 mg/0.5 mL PnIj, Inject 15 mg into the skin once a week., Disp: 4 Pen, Rfl: 5    multivitamin with minerals tablet, Take 1 tablet by mouth once daily., Disp: , Rfl:     nitroGLYCERIN (NITROSTAT) 0.4 MG SL tablet, Place 1 tablet under the tongue every 5 (five) minutes as needed., Disp: , Rfl:     pantoprazole (PROTONIX) 40 MG tablet, Take 1 tablet (40 mg total) by mouth once daily., Disp: 30 tablet, Rfl: 5    potassium citrate (UROCIT-K) 10 mEq (1,080 mg) TbSR, Take 20 mEq by mouth., Disp: , Rfl:     tamsulosin (FLOMAX) 0.4 mg Cap, Take 0.4 mg by mouth., Disp: , Rfl:     traZODone (DESYREL) 50 MG tablet, Take 1 tablet (50 mg total) by mouth nightly as needed for Insomnia., Disp: 30 tablet, Rfl: 5    ondansetron (ZOFRAN-ODT) 8 MG TbDL, Take 1 tablet (8 mg total) by mouth every 8 (eight) hours as needed (nausea)., Disp: 20 tablet, Rfl: 0  No current facility-administered medications for this visit.

## 2025-05-01 ENCOUNTER — RESULTS FOLLOW-UP (OUTPATIENT)
Dept: FAMILY MEDICINE | Facility: CLINIC | Age: 60
End: 2025-05-01

## 2025-05-01 DIAGNOSIS — E87.6 HYPOKALEMIA: Primary | ICD-10-CM

## 2025-05-01 NOTE — PROGRESS NOTES
Potassium is low and she is a little dehydrated but she was having nausea and diarrhea. If she is better, recheck labs next week.

## 2025-05-05 ENCOUNTER — OFFICE VISIT (OUTPATIENT)
Dept: FAMILY MEDICINE | Facility: CLINIC | Age: 60
End: 2025-05-05
Payer: MEDICAID

## 2025-05-05 ENCOUNTER — PATIENT OUTREACH (OUTPATIENT)
Facility: HOSPITAL | Age: 60
End: 2025-05-05
Payer: MEDICAID

## 2025-05-05 VITALS
BODY MASS INDEX: 25.13 KG/M2 | OXYGEN SATURATION: 100 % | HEART RATE: 83 BPM | HEIGHT: 64 IN | TEMPERATURE: 98 F | DIASTOLIC BLOOD PRESSURE: 80 MMHG | WEIGHT: 147.19 LBS | RESPIRATION RATE: 18 BRPM | SYSTOLIC BLOOD PRESSURE: 134 MMHG

## 2025-05-05 DIAGNOSIS — E87.6 HYPOKALEMIA: ICD-10-CM

## 2025-05-05 DIAGNOSIS — E11.9 TYPE 2 DIABETES MELLITUS WITHOUT COMPLICATION, WITHOUT LONG-TERM CURRENT USE OF INSULIN: ICD-10-CM

## 2025-05-05 DIAGNOSIS — L29.9 ITCHING: ICD-10-CM

## 2025-05-05 DIAGNOSIS — R31.9 URINARY TRACT INFECTION WITH HEMATURIA, SITE UNSPECIFIED: Primary | ICD-10-CM

## 2025-05-05 DIAGNOSIS — R35.0 URINARY FREQUENCY: ICD-10-CM

## 2025-05-05 DIAGNOSIS — N39.0 URINARY TRACT INFECTION WITH HEMATURIA, SITE UNSPECIFIED: Primary | ICD-10-CM

## 2025-05-05 DIAGNOSIS — M54.50 ACUTE LOW BACK PAIN WITHOUT SCIATICA, UNSPECIFIED BACK PAIN LATERALITY: ICD-10-CM

## 2025-05-05 LAB
ANION GAP SERPL CALCULATED.3IONS-SCNC: 18 MMOL/L (ref 7–16)
BILIRUB SERPL-MCNC: NEGATIVE MG/DL
BLOOD URINE, POC: ABNORMAL
BUN SERPL-MCNC: 9 MG/DL (ref 10–20)
BUN/CREAT SERPL: 11 (ref 6–20)
CALCIUM SERPL-MCNC: 9.6 MG/DL (ref 8.4–10.2)
CHLORIDE SERPL-SCNC: 105 MMOL/L (ref 98–107)
CLARITY, UA: CLEAR
CO2 SERPL-SCNC: 24 MMOL/L (ref 22–29)
COLOR, UA: YELLOW
CREAT SERPL-MCNC: 0.82 MG/DL (ref 0.55–1.02)
EGFR (NO RACE VARIABLE) (RUSH/TITUS): 83 ML/MIN/1.73M2
GLUCOSE SERPL-MCNC: 85 MG/DL (ref 74–100)
GLUCOSE UR QL STRIP: 1000
KETONES UR QL STRIP: NEGATIVE
LEUKOCYTE ESTERASE URINE, POC: NEGATIVE
NITRITE, POC UA: NEGATIVE
PH, POC UA: 7
POTASSIUM SERPL-SCNC: 4.3 MMOL/L (ref 3.5–5.1)
PROTEIN, POC: NEGATIVE
SODIUM SERPL-SCNC: 143 MMOL/L (ref 136–145)
SPECIFIC GRAVITY, POC UA: 1.01
UROBILINOGEN, POC UA: 0.2

## 2025-05-05 PROCEDURE — 3008F BODY MASS INDEX DOCD: CPT | Mod: CPTII,,, | Performed by: FAMILY MEDICINE

## 2025-05-05 PROCEDURE — 3044F HG A1C LEVEL LT 7.0%: CPT | Mod: CPTII,,, | Performed by: FAMILY MEDICINE

## 2025-05-05 PROCEDURE — 1159F MED LIST DOCD IN RCRD: CPT | Mod: CPTII,,, | Performed by: FAMILY MEDICINE

## 2025-05-05 PROCEDURE — 3079F DIAST BP 80-89 MM HG: CPT | Mod: CPTII,,, | Performed by: FAMILY MEDICINE

## 2025-05-05 PROCEDURE — 87086 URINE CULTURE/COLONY COUNT: CPT | Mod: ,,, | Performed by: CLINICAL MEDICAL LABORATORY

## 2025-05-05 PROCEDURE — 3075F SYST BP GE 130 - 139MM HG: CPT | Mod: CPTII,,, | Performed by: FAMILY MEDICINE

## 2025-05-05 PROCEDURE — 80048 BASIC METABOLIC PNL TOTAL CA: CPT | Mod: ,,, | Performed by: CLINICAL MEDICAL LABORATORY

## 2025-05-05 PROCEDURE — 99213 OFFICE O/P EST LOW 20 MIN: CPT | Mod: ,,, | Performed by: FAMILY MEDICINE

## 2025-05-05 RX ORDER — LEVOFLOXACIN 500 MG/1
500 TABLET, FILM COATED ORAL DAILY
Qty: 7 TABLET | Refills: 0 | Status: SHIPPED | OUTPATIENT
Start: 2025-05-05

## 2025-05-05 RX ORDER — HYDROXYZINE HYDROCHLORIDE 25 MG/1
25 TABLET, FILM COATED ORAL 3 TIMES DAILY PRN
Qty: 30 TABLET | Refills: 1 | Status: SHIPPED | OUTPATIENT
Start: 2025-05-05

## 2025-05-05 NOTE — LETTER
AUTHORIZATION FOR RELEASE OF   CONFIDENTIAL INFORMATION    Dear Dr. Guardado,    We are seeing Leti Landeros, date of birth 1965, in the clinic at [facility]. Emmie Gaviria MD is the patient's PCP. Leti Landeros has an outstanding lab/procedure at the time we reviewed her chart. In order to help keep her health information updated, she has authorized us to request the following medical record(s):        (  )  MAMMOGRAM                                      (  )  COLONOSCOPY      (  )  PAP SMEAR                                          (  )  OUTSIDE LAB RESULTS     (  )  DEXA SCAN                                          ( x )  EYE EXAM            (  )  FOOT EXAM                                          (  )  ENTIRE RECORD     (  )  OUTSIDE IMMUNIZATIONS                 (  )  _______________         Please fax records to Liliana Thrasher LPN Care Coordinator at [phone].      If you have any questions, please contact Liliana at [phone].           Patient Name: Leti Landeros  : 1965  Patient Phone #: [phone]

## 2025-05-05 NOTE — PROGRESS NOTES
"New Clinic Note    Leti Landeros is a 59 y.o. female     CC:   Chief Complaint   Patient presents with    Itching     Patient is itching all over. She has no rash.     Urinary Tract Infection     Patient thinks she has a UTI. She is having low back pain and urinary frequency     Sinus Problem     Patient is also having some sinus pressure in her head and ears are hurting.        Subjective    History of Present Illness HPI   Patient complains of low back pain and urinary frequency for one week. She has not taken anything for her symptoms. She denies fever. She complains of "all over" itching. She denies a rash. She needs refills and recheck on her labs.   Current Medications[1]     Past Medical History:   Diagnosis Date    ARRON positive     Carpal tunnel syndrome on both sides     Chronic idiopathic constipation     dx by Dr. Kim    Diabetes mellitus     Fibromyalgia     GERD (gastroesophageal reflux disease)     History of renal stone     Hypertension     Moderate major depression     Scleroderma     Sinusitis         Family History   Problem Relation Name Age of Onset    Diabetes Mother      Cancer Father          Past Surgical History:   Procedure Laterality Date     SECTION      DILATION AND CURETTAGE OF UTERUS      TONSILLECTOMY, ADENOIDECTOMY          Review of Systems   Constitutional:  Negative for fatigue and fever.   HENT:  Negative for ear pain, postnasal drip, rhinorrhea and sinus pressure/congestion.    Respiratory:  Negative for cough and shortness of breath.    Cardiovascular:  Negative for chest pain.   Gastrointestinal:  Negative for abdominal pain, diarrhea, nausea and vomiting.   Genitourinary:  Negative for dysuria.   Neurological:  Negative for headaches.        /80 (BP Location: Left arm, Patient Position: Lying)   Pulse 83   Temp 98.1 °F (36.7 °C) (Oral)   Resp 18   Ht 5' 4" (1.626 m)   Wt 66.8 kg (147 lb 3.2 oz)   LMP 03/15/2022 (Approximate) Comment: patient states " she has irregular periods d/t menopause  SpO2 100%   BMI 25.27 kg/m²      Physical Exam  HENT:      Head: Normocephalic and atraumatic.   Cardiovascular:      Rate and Rhythm: Normal rate and regular rhythm.   Pulmonary:      Effort: Pulmonary effort is normal.      Breath sounds: Normal breath sounds.   Neurological:      Mental Status: She is alert and oriented to person, place, and time.   Psychiatric:         Mood and Affect: Mood normal.         Behavior: Behavior normal.          Assessment and Plan      ICD-10-CM ICD-9-CM   1. Urinary tract infection with hematuria, site unspecified  N39.0 599.0    R31.9 599.70   2. Urinary frequency  R35.0 788.41   3. Acute low back pain without sciatica, unspecified back pain laterality  M54.50 724.2   4. Hypokalemia  E87.6 276.8   5. Type 2 diabetes mellitus without complication, without long-term current use of insulin  E11.9 250.00   6. Itching  L29.9 698.9        1. Urinary tract infection with hematuria, site unspecified  -     levoFLOXacin (LEVAQUIN) 500 MG tablet; Take 1 tablet (500 mg total) by mouth once daily.  Dispense: 7 tablet; Refill: 0    2. Urinary frequency  Culture is pending.   -     POCT URINALYSIS W/O SCOPE  -     Urine Culture High Risk    3. Acute low back pain without sciatica, unspecified back pain laterality  -     POCT URINALYSIS W/O SCOPE  -     Urine Culture High Risk    4. Hypokalemia  -     Basic Metabolic Panel    5. Type 2 diabetes mellitus without complication, without long-term current use of insulin  The current medical regimen is effective;  continue present plan and medications.  -     blood sugar diagnostic Strp; 1 strip by Misc.(Non-Drug; Combo Route) route 2 (two) times a day.  Dispense: 200 strip; Refill: 3  -     lancets 32 gauge Misc; 1 lancet  by Misc.(Non-Drug; Combo Route) route 2 (two) times a day.  Dispense: 200 each; Refill: 3    6. Itching  Will try hydroxyzine for itching.   -     hydrOXYzine HCL (ATARAX) 25 MG tablet;  Take 1 tablet (25 mg total) by mouth 3 (three) times daily as needed for Itching.  Dispense: 30 tablet; Refill: 1         Follow up if symptoms worsen or fail to improve.     Patient declines colonoscopy and mammogram.          [1]   Current Outpatient Medications:     aluminum chloride (DRYSOL DAB-O-MATIC) 20 % external solution, Apply to underarms daily, Disp: 60 mL, Rfl: 11    atenoloL-chlorthalidone (TENORETIC) 50-25 mg Tab, Take 1 tablet by mouth once daily., Disp: , Rfl:     atorvastatin (LIPITOR) 10 MG tablet, Take 1 tablet (10 mg total) by mouth once daily., Disp: 90 tablet, Rfl: 3    chlorhexidine (HIBICLENS) 4 % external liquid, Use as a daily wash to affected areas, Disp: 473 mL, Rfl: 11    cyclobenzaprine (FLEXERIL) 10 MG tablet, Take 10 mg by mouth 3 (three) times daily as needed., Disp: , Rfl:     empagliflozin (JARDIANCE) 25 mg tablet, Take 1 tablet (25 mg total) by mouth once daily., Disp: 30 tablet, Rfl: 5    ergocalciferol (ERGOCALCIFEROL) 50,000 unit Cap, Take 1 capsule (50,000 Units total) by mouth every 7 days., Disp: 4 capsule, Rfl: 5    FLUoxetine 20 MG capsule, Take 1 capsule (20 mg total) by mouth once daily., Disp: 30 capsule, Rfl: 5    linaCLOtide (LINZESS) 145 mcg Cap capsule, Take 1 capsule (145 mcg total) by mouth before breakfast., Disp: 30 capsule, Rfl: 11    meclizine (ANTIVERT) 25 mg tablet, Take 1 tablet (25 mg total) by mouth 3 (three) times daily as needed for Dizziness., Disp: 30 tablet, Rfl: 2    MOUNJARO 15 mg/0.5 mL PnIj, Inject 15 mg into the skin once a week., Disp: 4 Pen, Rfl: 5    multivitamin with minerals tablet, Take 1 tablet by mouth once daily., Disp: , Rfl:     nitroGLYCERIN (NITROSTAT) 0.4 MG SL tablet, Place 1 tablet under the tongue every 5 (five) minutes as needed., Disp: , Rfl:     ondansetron (ZOFRAN-ODT) 8 MG TbDL, Take 1 tablet (8 mg total) by mouth every 8 (eight) hours as needed (nausea)., Disp: 20 tablet, Rfl: 0    pantoprazole (PROTONIX) 40 MG tablet,  Take 1 tablet (40 mg total) by mouth once daily., Disp: 30 tablet, Rfl: 5    potassium citrate (UROCIT-K) 10 mEq (1,080 mg) TbSR, Take 20 mEq by mouth., Disp: , Rfl:     tamsulosin (FLOMAX) 0.4 mg Cap, Take 0.4 mg by mouth., Disp: , Rfl:     traZODone (DESYREL) 50 MG tablet, Take 1 tablet (50 mg total) by mouth nightly as needed for Insomnia., Disp: 30 tablet, Rfl: 5    blood sugar diagnostic Strp, 1 strip by Misc.(Non-Drug; Combo Route) route 2 (two) times a day., Disp: 200 strip, Rfl: 3    hydrOXYzine HCL (ATARAX) 25 MG tablet, Take 1 tablet (25 mg total) by mouth 3 (three) times daily as needed for Itching., Disp: 30 tablet, Rfl: 1    lancets 32 gauge Misc, 1 lancet  by Misc.(Non-Drug; Combo Route) route 2 (two) times a day., Disp: 200 each, Rfl: 3    levoFLOXacin (LEVAQUIN) 500 MG tablet, Take 1 tablet (500 mg total) by mouth once daily., Disp: 7 tablet, Rfl: 0

## 2025-05-05 NOTE — PROGRESS NOTES
Population Health Chart Review & Patient Outreach Details    Health Maintenance Topics Addressed and Outreach Outcomes / Actions Taken:  Diabetic Eye Exam [x] KRISTEL sent to [facility].

## 2025-05-07 ENCOUNTER — RESULTS FOLLOW-UP (OUTPATIENT)
Dept: FAMILY MEDICINE | Facility: CLINIC | Age: 60
End: 2025-05-07

## 2025-05-07 LAB — UA COMPLETE W REFLEX CULTURE PNL UR: NO GROWTH

## 2025-07-15 ENCOUNTER — OFFICE VISIT (OUTPATIENT)
Dept: FAMILY MEDICINE | Facility: CLINIC | Age: 60
End: 2025-07-15
Payer: MEDICAID

## 2025-07-15 VITALS
HEART RATE: 76 BPM | SYSTOLIC BLOOD PRESSURE: 136 MMHG | DIASTOLIC BLOOD PRESSURE: 77 MMHG | TEMPERATURE: 98 F | OXYGEN SATURATION: 100 % | HEIGHT: 64 IN | RESPIRATION RATE: 18 BRPM | BODY MASS INDEX: 26.8 KG/M2 | WEIGHT: 157 LBS

## 2025-07-15 DIAGNOSIS — M79.7 FIBROMYALGIA: Primary | ICD-10-CM

## 2025-07-15 DIAGNOSIS — R35.0 URINATION FREQUENCY: ICD-10-CM

## 2025-07-15 LAB
BILIRUB SERPL-MCNC: NEGATIVE MG/DL
BILIRUB UR QL STRIP: NEGATIVE
BLOOD URINE, POC: ABNORMAL
CLARITY UR: CLEAR
CLARITY, UA: CLEAR
COLOR UR: COLORLESS
COLOR, UA: YELLOW
GLUCOSE UR QL STRIP: 1000
GLUCOSE UR STRIP-MCNC: >1000 MG/DL
KETONES UR QL STRIP: NEGATIVE
KETONES UR STRIP-SCNC: NEGATIVE MG/DL
LEUKOCYTE ESTERASE UR QL STRIP: NEGATIVE
LEUKOCYTE ESTERASE URINE, POC: NEGATIVE
NITRITE UR QL STRIP: NEGATIVE
NITRITE, POC UA: NEGATIVE
PH UR STRIP: 6 PH UNITS
PH, POC UA: 6
PROT UR QL STRIP: NEGATIVE
PROTEIN, POC: NEGATIVE
RBC # UR STRIP: NEGATIVE /UL
SP GR UR STRIP: 1.04
SPECIFIC GRAVITY, POC UA: 1.01
UROBILINOGEN UR STRIP-ACNC: NORMAL MG/DL
UROBILINOGEN, POC UA: 0.2

## 2025-07-15 PROCEDURE — 99213 OFFICE O/P EST LOW 20 MIN: CPT | Mod: 25,,,

## 2025-07-15 PROCEDURE — 3075F SYST BP GE 130 - 139MM HG: CPT | Mod: CPTII,,,

## 2025-07-15 PROCEDURE — 3008F BODY MASS INDEX DOCD: CPT | Mod: CPTII,,,

## 2025-07-15 PROCEDURE — 3078F DIAST BP <80 MM HG: CPT | Mod: CPTII,,,

## 2025-07-15 PROCEDURE — 1159F MED LIST DOCD IN RCRD: CPT | Mod: CPTII,,,

## 2025-07-15 PROCEDURE — 3044F HG A1C LEVEL LT 7.0%: CPT | Mod: CPTII,,,

## 2025-07-15 PROCEDURE — 96372 THER/PROPH/DIAG INJ SC/IM: CPT | Mod: ,,,

## 2025-07-15 PROCEDURE — 1160F RVW MEDS BY RX/DR IN RCRD: CPT | Mod: CPTII,,,

## 2025-07-15 PROCEDURE — 81003 URINALYSIS AUTO W/O SCOPE: CPT | Mod: QW,,, | Performed by: CLINICAL MEDICAL LABORATORY

## 2025-07-15 RX ORDER — KETOROLAC TROMETHAMINE 10 MG/1
10 TABLET, FILM COATED ORAL EVERY 6 HOURS PRN
Qty: 20 TABLET | Refills: 0 | Status: SHIPPED | OUTPATIENT
Start: 2025-07-15 | End: 2025-07-20

## 2025-07-15 RX ORDER — KETOROLAC TROMETHAMINE 30 MG/ML
60 INJECTION, SOLUTION INTRAMUSCULAR; INTRAVENOUS
Status: COMPLETED | OUTPATIENT
Start: 2025-07-15 | End: 2025-07-15

## 2025-07-15 RX ADMIN — KETOROLAC TROMETHAMINE 60 MG: 30 INJECTION, SOLUTION INTRAMUSCULAR; INTRAVENOUS at 04:07

## 2025-07-16 NOTE — PROGRESS NOTES
Subjective     Patient ID: Leti Landeros is a 60 y.o. female.    Chief Complaint: Pain (Patient states she is having pain in both legs, hips, and back started around July 3rd. Can't get in a comfortable position with out hurting ) and Urinary Frequency (Started few days ago. No over the counter medication taken )    Pain    Urinary Frequency   Associated symptoms include frequency.     Review of Systems   Genitourinary:  Positive for frequency.          Objective     Physical Exam  Vitals and nursing note reviewed.   Constitutional:       Appearance: Normal appearance.   HENT:      Head: Normocephalic and atraumatic.      Right Ear: Tympanic membrane, ear canal and external ear normal.      Left Ear: Tympanic membrane, ear canal and external ear normal.      Nose: Nose normal.      Mouth/Throat:      Mouth: Mucous membranes are moist.      Pharynx: Oropharynx is clear.   Eyes:      Pupils: Pupils are equal, round, and reactive to light.   Cardiovascular:      Rate and Rhythm: Normal rate and regular rhythm.      Pulses: Normal pulses.      Heart sounds: Normal heart sounds.   Pulmonary:      Effort: Pulmonary effort is normal.      Breath sounds: Normal breath sounds.   Abdominal:      General: Bowel sounds are normal.      Palpations: Abdomen is soft.   Musculoskeletal:         General: Tenderness present. No swelling.      Cervical back: Normal range of motion.      Lumbar back: Tenderness present.      Right hip: Tenderness present.      Left hip: Tenderness present.      Right upper leg: Tenderness present.      Left upper leg: Tenderness present.      Right lower leg: Tenderness present.      Left lower leg: Tenderness present.   Skin:     General: Skin is warm.      Capillary Refill: Capillary refill takes less than 2 seconds.   Neurological:      General: No focal deficit present.      Mental Status: She is alert.   Psychiatric:         Mood and Affect: Mood normal.            Assessment and Plan     1.  Fibromyalgia  -     ketorolac injection 60 mg  -     ketorolac (TORADOL) 10 mg tablet; Take 1 tablet (10 mg total) by mouth every 6 (six) hours as needed for Pain.  Dispense: 20 tablet; Refill: 0    2. Urination frequency  -     POCT URINALYSIS W/O SCOPE  -     Urinalysis, Reflex to Urine Culture        Pt endorses lower body pain, has hx of fibromyalgia. Will trial toradol and f/u with culture results. Pt denies saddle anesthesia and numbness to lower extremities         No follow-ups on file.

## 2025-07-21 ENCOUNTER — OFFICE VISIT (OUTPATIENT)
Dept: FAMILY MEDICINE | Facility: CLINIC | Age: 60
End: 2025-07-21
Payer: MEDICAID

## 2025-07-21 VITALS
HEIGHT: 64 IN | HEART RATE: 88 BPM | WEIGHT: 157 LBS | SYSTOLIC BLOOD PRESSURE: 126 MMHG | TEMPERATURE: 98 F | OXYGEN SATURATION: 96 % | BODY MASS INDEX: 26.8 KG/M2 | DIASTOLIC BLOOD PRESSURE: 68 MMHG | RESPIRATION RATE: 16 BRPM

## 2025-07-21 DIAGNOSIS — N39.0 URINARY TRACT INFECTION WITH HEMATURIA, SITE UNSPECIFIED: Primary | ICD-10-CM

## 2025-07-21 DIAGNOSIS — R31.9 URINARY TRACT INFECTION WITH HEMATURIA, SITE UNSPECIFIED: Primary | ICD-10-CM

## 2025-07-21 DIAGNOSIS — N32.89 BLADDER SPASMS: ICD-10-CM

## 2025-07-21 LAB
BILIRUB SERPL-MCNC: ABNORMAL MG/DL
BLOOD URINE, POC: ABNORMAL
CLARITY, UA: CLEAR
COLOR, UA: YELLOW
GLUCOSE UR QL STRIP: 1000
KETONES UR QL STRIP: ABNORMAL
LEUKOCYTE ESTERASE URINE, POC: ABNORMAL
NITRITE, POC UA: ABNORMAL
PH, POC UA: 5.5
PROTEIN, POC: ABNORMAL
SPECIFIC GRAVITY, POC UA: 1.02
UROBILINOGEN, POC UA: 0.2

## 2025-07-21 PROCEDURE — 1160F RVW MEDS BY RX/DR IN RCRD: CPT | Mod: CPTII,,, | Performed by: FAMILY MEDICINE

## 2025-07-21 PROCEDURE — 87086 URINE CULTURE/COLONY COUNT: CPT | Mod: ,,, | Performed by: CLINICAL MEDICAL LABORATORY

## 2025-07-21 PROCEDURE — 99213 OFFICE O/P EST LOW 20 MIN: CPT | Mod: ,,, | Performed by: FAMILY MEDICINE

## 2025-07-21 PROCEDURE — 3044F HG A1C LEVEL LT 7.0%: CPT | Mod: CPTII,,, | Performed by: FAMILY MEDICINE

## 2025-07-21 PROCEDURE — 3008F BODY MASS INDEX DOCD: CPT | Mod: CPTII,,, | Performed by: FAMILY MEDICINE

## 2025-07-21 PROCEDURE — 3078F DIAST BP <80 MM HG: CPT | Mod: CPTII,,, | Performed by: FAMILY MEDICINE

## 2025-07-21 PROCEDURE — 3074F SYST BP LT 130 MM HG: CPT | Mod: CPTII,,, | Performed by: FAMILY MEDICINE

## 2025-07-21 PROCEDURE — 1159F MED LIST DOCD IN RCRD: CPT | Mod: CPTII,,, | Performed by: FAMILY MEDICINE

## 2025-07-21 RX ORDER — LEVOFLOXACIN 500 MG/1
500 TABLET, FILM COATED ORAL DAILY
Qty: 7 TABLET | Refills: 0 | Status: SHIPPED | OUTPATIENT
Start: 2025-07-21

## 2025-07-21 RX ORDER — PHENAZOPYRIDINE HYDROCHLORIDE 200 MG/1
200 TABLET, FILM COATED ORAL 3 TIMES DAILY PRN
Qty: 15 TABLET | Refills: 0 | Status: SHIPPED | OUTPATIENT
Start: 2025-07-21 | End: 2025-07-31

## 2025-07-23 LAB — UA COMPLETE W REFLEX CULTURE PNL UR: NO GROWTH

## 2025-07-31 DIAGNOSIS — N39.0 URINARY TRACT INFECTION WITH HEMATURIA, SITE UNSPECIFIED: Primary | ICD-10-CM

## 2025-07-31 DIAGNOSIS — R31.9 URINARY TRACT INFECTION WITH HEMATURIA, SITE UNSPECIFIED: Primary | ICD-10-CM

## 2025-07-31 RX ORDER — FLUCONAZOLE 150 MG/1
150 TABLET ORAL DAILY
Qty: 2 TABLET | Refills: 0 | Status: SHIPPED | OUTPATIENT
Start: 2025-07-31 | End: 2025-08-02

## 2025-08-29 ENCOUNTER — PATIENT MESSAGE (OUTPATIENT)
Facility: HOSPITAL | Age: 60
End: 2025-08-29
Payer: MEDICAID